# Patient Record
Sex: MALE | Race: BLACK OR AFRICAN AMERICAN | NOT HISPANIC OR LATINO | URBAN - METROPOLITAN AREA
[De-identification: names, ages, dates, MRNs, and addresses within clinical notes are randomized per-mention and may not be internally consistent; named-entity substitution may affect disease eponyms.]

---

## 2019-10-05 ENCOUNTER — EMERGENCY (EMERGENCY)
Facility: HOSPITAL | Age: 24
LOS: 0 days | Discharge: HOME | End: 2019-10-06
Attending: STUDENT IN AN ORGANIZED HEALTH CARE EDUCATION/TRAINING PROGRAM
Payer: OTHER MISCELLANEOUS

## 2019-10-05 VITALS
HEART RATE: 92 BPM | SYSTOLIC BLOOD PRESSURE: 149 MMHG | DIASTOLIC BLOOD PRESSURE: 92 MMHG | HEIGHT: 70 IN | TEMPERATURE: 98 F | OXYGEN SATURATION: 100 % | WEIGHT: 278 LBS | RESPIRATION RATE: 16 BRPM

## 2019-10-05 DIAGNOSIS — M25.562 PAIN IN LEFT KNEE: ICD-10-CM

## 2019-10-05 PROCEDURE — 99053 MED SERV 10PM-8AM 24 HR FAC: CPT

## 2019-10-05 PROCEDURE — 73564 X-RAY EXAM KNEE 4 OR MORE: CPT | Mod: 26,LT

## 2019-10-05 PROCEDURE — 99283 EMERGENCY DEPT VISIT LOW MDM: CPT

## 2019-10-05 RX ORDER — IBUPROFEN 200 MG
600 TABLET ORAL ONCE
Refills: 0 | Status: COMPLETED | OUTPATIENT
Start: 2019-10-05 | End: 2019-10-05

## 2019-10-05 NOTE — ED ADULT NURSE NOTE - OBJECTIVE STATEMENT
Patient came into the ER via EMS . 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 Patient is alert and orientedx3. Noted no complaints of pa in or any discomfort.

## 2019-10-06 VITALS
RESPIRATION RATE: 16 BRPM | SYSTOLIC BLOOD PRESSURE: 137 MMHG | DIASTOLIC BLOOD PRESSURE: 88 MMHG | HEART RATE: 87 BPM | OXYGEN SATURATION: 100 % | TEMPERATURE: 98 F

## 2019-10-06 RX ADMIN — Medication 600 MILLIGRAM(S): at 01:03

## 2019-10-06 RX ADMIN — Medication 600 MILLIGRAM(S): at 01:02

## 2019-10-06 NOTE — ED PROVIDER NOTE - PATIENT PORTAL LINK FT
You can access the FollowMyHealth Patient Portal offered by [facility] by registering at the following website: [email or web address]. By joining [facility]’s FollowMyHealth portal, you will also be able to view your health information using other applications (apps) compatible with our system.

## 2019-10-06 NOTE — ED PROVIDER NOTE - OBJECTIVE STATEMENT
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 Patient reports wheelchair carrying patient tipped and fell onto his knee, full weight of his patient fell onto his knee. States he felt his patellar bone dislocate and then pop back into place. Reports this has happened to him before. Reports he now has swelling and pain of knee with ROM. Denies numbness or tingling sensation. Denies skin breaks. Denies any other injuries. Reports he cannot bear weight fully, walking with a limp.

## 2019-10-06 NOTE — ED PROVIDER NOTE - CLINICAL SUMMARY MEDICAL DECISION MAKING FREE TEXT BOX
Male presents to ED for evaluation of left knee pain s/p injury ealier today, bearing weight with a limp - imaging, medicate, reassess

## 2019-10-06 NOTE — ED PROVIDER NOTE - PROGRESS NOTE DETAILS
Patient was seen and examined at bedside. Reports feeling much better after medications. Wants to be discharged home. Patient appears to be improved. Crutches given, crutch training given, and knee splint applied. Patient was seen and examined at bedside. Reports feeling much better after medications. Wants to be discharged home. Patient appears to be improved.

## 2019-10-06 NOTE — ED PROVIDER NOTE - CARE PROVIDER_API CALL
Agustín Mendiola)  Orthopaedic Surgery  [address] [place] [address]  Phone: [phone]  Fax: [phone]  Follow Up Time:

## 2019-10-06 NOTE — ED PROVIDER NOTE - NSFOLLOWUPINSTRUCTIONS_ED_ALL_ED_FT
Please return to Emergency Department immediately for any new, concerning, or worsening symptoms.   Please follow-up with orthopedics as recommended.    Please take prescriptions as discussed.

## 2019-10-06 NOTE — ED PROVIDER NOTE - NS ED ROS FT
Constitutional: no fevers or chills  Cardiac: no palpitations, chest pain  Lungs: no shortness of breath, wheezes  Abd: no abd pain, nausea, vomiting, diarrhea  Genitourinary: no dysuria, increased urinary frequency, hematuria  Neurology: no sensorimotor deficits, no dizziness, no headache, no visual changes  Skin: no rashes  All other ROS negative except as per HPI

## 2019-10-06 NOTE — ED PROVIDER NOTE - PHYSICAL EXAMINATION
Gen: no acute distress, well appearing, awake, alert and oriented x 3  Cardiac: regular rate and rhythm, +S1S2  Pulm: Clear to auscultation bilaterally  Abd: soft, nontender, nondistended, no guarding  Back: neg CVA ttp, nontender spine  Extremity: L LE: + ttp with mild edema, no ecchymosis, no skin breaks, FROM hip/knee/ankle joint, 2+ DP pulses, sensorimotor intact, neg Marshall's sign   Neuro: awake, alert, oriented x 3, sensorimotor intact

## 2020-04-26 ENCOUNTER — MESSAGE (OUTPATIENT)
Age: 25
End: 2020-04-26

## 2020-06-11 NOTE — ED ADULT NURSE NOTE - CAS EDP DISCH TYPE
Patient states that she has multiple jigger bites all over her arms and ankles. Patient has new bites popping up daily. She was bitten on Sunday while in [place]. Patient would like to know if there is a cream or medication for her to use. Home

## 2021-02-04 ENCOUNTER — TRANSCRIPTION ENCOUNTER (OUTPATIENT)
Age: 26
End: 2021-02-04

## 2021-08-17 ENCOUNTER — TRANSCRIPTION ENCOUNTER (OUTPATIENT)
Age: 26
End: 2021-08-17

## 2022-08-16 NOTE — ED ADULT NURSE NOTE - PAIN RATING/NUMBER SCALE (0-10): ACTIVITY
pristiq  
0

## 2022-09-02 PROBLEM — Z00.00 ENCOUNTER FOR PREVENTIVE HEALTH EXAMINATION: Status: ACTIVE | Noted: 2022-09-02

## 2022-09-06 ENCOUNTER — APPOINTMENT (OUTPATIENT)
Dept: PULMONOLOGY | Facility: CLINIC | Age: 27
End: 2022-09-06

## 2022-09-06 VITALS — DIASTOLIC BLOOD PRESSURE: 90 MMHG | SYSTOLIC BLOOD PRESSURE: 136 MMHG

## 2022-09-06 VITALS
SYSTOLIC BLOOD PRESSURE: 151 MMHG | TEMPERATURE: 98 F | WEIGHT: 298 LBS | RESPIRATION RATE: 16 BRPM | HEART RATE: 97 BPM | OXYGEN SATURATION: 97 % | HEIGHT: 70 IN | DIASTOLIC BLOOD PRESSURE: 101 MMHG | BODY MASS INDEX: 42.66 KG/M2

## 2022-09-06 PROCEDURE — 36415 COLL VENOUS BLD VENIPUNCTURE: CPT

## 2022-09-06 PROCEDURE — 99203 OFFICE O/P NEW LOW 30 MIN: CPT | Mod: 25

## 2022-09-06 NOTE — HISTORY OF PRESENT ILLNESS
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 Did PPD (done Aug 1st) for school which was reportedly positive (done at [employer]). Reports negative CXR.\par \par Born and raised in [place] Traveled to [place] Aug 2022 and [facility] Dec 2021. No known exposure to TB. 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 Never been/worked at [facility]/homeless shelter. \par Reports negative PPD a year ago. \par Denies cough, fevers, night sweats, wt loss, hemoptysis.\par \par PMH: DM, covid Sept 2021\par Meds: Metformin, Glimepiride\par All: NKDA\par SH: Never smoker\par FH: hx of multiple myeloma and HTN in mother\par PMD: THELMA VASQUES\par Immunizations: COVID vaccinated and boosted (march 2022)\par

## 2022-09-06 NOTE — CONSULT LETTER
[Dear  ___] : Dear  [unfilled], [Consult Letter:] : I had the pleasure of evaluating your patient, [unfilled]. [Please see my note below.] : Please see my note below. [Consult Closing:] : Thank you very much for allowing me to participate in the care of this patient.  If you have any questions, please do not hesitate to contact me. [FreeTextEntry3] : Sincerely,\par \par Swetha Mcnulty MD\par [facility] Physician [facility]\par Pulmonary Medicine\par tel: [phone]\par fax: [phone]\par

## 2022-09-06 NOTE — ASSESSMENT
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 \par Reports negative PPD a year ago and recent clear CXR. No s/s of active TB. 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 \par Treatment options and reasons for treatment discussed with patient. \par Will obtain Quant Gold, LFTs, CBC, HIV testing.\par Obtain CXR results (CD and report)\par \par All questions answered. Patient in agreement with plan. \par Follow up via TEB after blood work to discuss treatment.\par \par \par

## 2022-09-09 ENCOUNTER — APPOINTMENT (OUTPATIENT)
Dept: DERMATOLOGY | Facility: CLINIC | Age: 27
End: 2022-09-09

## 2022-09-09 ENCOUNTER — NON-APPOINTMENT (OUTPATIENT)
Age: 27
End: 2022-09-09

## 2022-09-09 VITALS — BODY MASS INDEX: 42.52 KG/M2 | HEIGHT: 70 IN | WEIGHT: 297 LBS

## 2022-09-09 PROCEDURE — 17110 DESTRUCTION B9 LES UP TO 14: CPT

## 2022-09-09 PROCEDURE — 99204 OFFICE O/P NEW MOD 45 MIN: CPT | Mod: 25

## 2022-09-09 RX ORDER — HYDROCORTISONE 25 MG/G
2.5 CREAM TOPICAL
Qty: 1 | Refills: 3 | Status: ACTIVE | COMMUNITY
Start: 2022-09-09 | End: 1900-01-01

## 2022-09-12 ENCOUNTER — NON-APPOINTMENT (OUTPATIENT)
Age: 27
End: 2022-09-12

## 2022-09-12 LAB
ALBUMIN SERPL ELPH-MCNC: 4.5 G/DL
ALP BLD-CCNC: 48 U/L
ALT SERPL-CCNC: 33 U/L
ANION GAP SERPL CALC-SCNC: 15 MMOL/L
AST SERPL-CCNC: 19 U/L
BASOPHILS # BLD AUTO: 0.02 K/UL
BASOPHILS NFR BLD AUTO: 0.3 %
BILIRUB SERPL-MCNC: 0.4 MG/DL
BUN SERPL-MCNC: 12 MG/DL
CALCIUM SERPL-MCNC: 10 MG/DL
CHLORIDE SERPL-SCNC: 100 MMOL/L
CO2 SERPL-SCNC: 21 MMOL/L
CREAT SERPL-MCNC: 0.79 MG/DL
EGFR: 123 ML/MIN/1.73M2
EOSINOPHIL # BLD AUTO: 0.38 K/UL
EOSINOPHIL NFR BLD AUTO: 6 %
GLUCOSE SERPL-MCNC: 250 MG/DL
HCT VFR BLD CALC: 45.8 %
HGB BLD-MCNC: 14.4 G/DL
HIV1+2 AB SPEC QL IA.RAPID: NONREACTIVE
IMM GRANULOCYTES NFR BLD AUTO: 0.5 %
LYMPHOCYTES # BLD AUTO: 2.49 K/UL
LYMPHOCYTES NFR BLD AUTO: 39 %
M TB IFN-G BLD-IMP: POSITIVE
MAN DIFF?: NORMAL
MCHC RBC-ENTMCNC: 23.8 PG
MCHC RBC-ENTMCNC: 31.4 GM/DL
MCV RBC AUTO: 75.8 FL
MONOCYTES # BLD AUTO: 0.42 K/UL
MONOCYTES NFR BLD AUTO: 6.6 %
NEUTROPHILS # BLD AUTO: 3.04 K/UL
NEUTROPHILS NFR BLD AUTO: 47.6 %
PLATELET # BLD AUTO: 284 K/UL
POTASSIUM SERPL-SCNC: 4.1 MMOL/L
PROT SERPL-MCNC: 7.3 G/DL
QUANTIFERON TB PLUS MITOGEN MINUS NIL: 2.42 IU/ML
QUANTIFERON TB PLUS NIL: 0.03 IU/ML
QUANTIFERON TB PLUS TB1 MINUS NIL: 6.74 IU/ML
QUANTIFERON TB PLUS TB2 MINUS NIL: 6.04 IU/ML
RBC # BLD: 6.04 M/UL
RBC # FLD: 15.6 %
SODIUM SERPL-SCNC: 136 MMOL/L
WBC # FLD AUTO: 6.38 K/UL

## 2022-09-16 ENCOUNTER — APPOINTMENT (OUTPATIENT)
Dept: PULMONOLOGY | Facility: CLINIC | Age: 27
End: 2022-09-16

## 2022-09-16 DIAGNOSIS — R76.11 NONSPECIFIC REACTION TO TUBERCULIN SKIN TEST W/OUT ACTIVE TUBERCULOSIS: ICD-10-CM

## 2022-09-16 PROCEDURE — 99214 OFFICE O/P EST MOD 30 MIN: CPT | Mod: 95

## 2022-09-16 NOTE — HISTORY OF PRESENT ILLNESS
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 Reports negative CXR.\par \par Born [place] raised in [place] Traveled to [place] Aug 2022 and [facility] Dec 2021. No known exposure to TB. 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 \par Reports negative PPD a year ago. \par Denies cough, fevers, night sweats, wt loss, hemoptysis.\par \par PMH: DM, covid Sept 2021\par Meds: Metformin, Glimepiride\par All: NKDA\par SH: Never smoker\par FH: hx of multiple myeloma and HTN in mother\par PMD: THELMA VASQUES\par Immunizations: COVID vaccinated and boosted (march 2022)\par

## 2022-09-16 NOTE — CONSULT LETTER
[Dear  ___] : Dear  [unfilled], [Consult Letter:] : I had the pleasure of evaluating your patient, [unfilled]. [Please see my note below.] : Please see my note below. [Consult Closing:] : Thank you very much for allowing me to participate in the care of this patient.  If you have any questions, please do not hesitate to contact me. [FreeTextEntry3] : Sincerely,\par \par Swetha Mcnulty MD\par [facility] Physician [facility]\par Pulmonary Medicine\par tel: [phone]\par fax: [phone]\par

## 2022-09-16 NOTE — ASSESSMENT
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 Patient denies any s/s of TB. \par CXR report reviewed - GAMAL. Overall c/w LTBI. Overall risk of reactivation TB is low. \par Therapy options discussed with patient. Patient declines therapy at this time. \par Patient understands risk of reactivation TB, understands risk would increase if he ever required immunosuppression. Common s/s of TB discussed with patient. \par \par He can follow up as needed or with any new or concerning sx. \par \par \par

## 2022-09-16 NOTE — REASON FOR VISIT
[Home] : at home, [unfilled] , at the time of the visit. [Medical Office: ([facility])___] : at the medical office located in  [Patient] : the patient

## 2022-10-13 ENCOUNTER — APPOINTMENT (OUTPATIENT)
Dept: DERMATOLOGY | Facility: CLINIC | Age: 27
End: 2022-10-13

## 2022-10-13 DIAGNOSIS — B07.9 VIRAL WART, UNSPECIFIED: ICD-10-CM

## 2022-10-13 DIAGNOSIS — L73.1 PSEUDOFOLLICULITIS BARBAE: ICD-10-CM

## 2022-10-13 PROCEDURE — 99213 OFFICE O/P EST LOW 20 MIN: CPT | Mod: GC

## 2022-12-08 ENCOUNTER — APPOINTMENT (OUTPATIENT)
Dept: DERMATOLOGY | Facility: CLINIC | Age: 27
End: 2022-12-08

## 2023-12-24 ENCOUNTER — EMERGENCY (EMERGENCY)
Facility: HOSPITAL | Age: 28
LOS: 1 days | Discharge: HOME | End: 2023-12-24
Attending: EMERGENCY MEDICINE
Payer: MEDICAID

## 2023-12-24 VITALS
OXYGEN SATURATION: 98 % | HEIGHT: 70 IN | HEART RATE: 105 BPM | RESPIRATION RATE: 18 BRPM | DIASTOLIC BLOOD PRESSURE: 78 MMHG | SYSTOLIC BLOOD PRESSURE: 136 MMHG | WEIGHT: 298.95 LBS | TEMPERATURE: 99 F

## 2023-12-24 VITALS
HEART RATE: 92 BPM | TEMPERATURE: 99 F | DIASTOLIC BLOOD PRESSURE: 78 MMHG | RESPIRATION RATE: 18 BRPM | SYSTOLIC BLOOD PRESSURE: 127 MMHG | OXYGEN SATURATION: 95 %

## 2023-12-24 LAB
FLUAV AG NPH QL: SIGNIFICANT CHANGE UP
FLUAV AG NPH QL: SIGNIFICANT CHANGE UP
FLUBV AG NPH QL: SIGNIFICANT CHANGE UP
FLUBV AG NPH QL: SIGNIFICANT CHANGE UP
RSV RNA NPH QL NAA+NON-PROBE: SIGNIFICANT CHANGE UP
RSV RNA NPH QL NAA+NON-PROBE: SIGNIFICANT CHANGE UP
SARS-COV-2 RNA SPEC QL NAA+PROBE: SIGNIFICANT CHANGE UP
SARS-COV-2 RNA SPEC QL NAA+PROBE: SIGNIFICANT CHANGE UP

## 2023-12-24 PROCEDURE — 99284 EMERGENCY DEPT VISIT MOD MDM: CPT | Mod: 25

## 2023-12-24 PROCEDURE — 71046 X-RAY EXAM CHEST 2 VIEWS: CPT | Mod: 26

## 2023-12-24 PROCEDURE — 71046 X-RAY EXAM CHEST 2 VIEWS: CPT

## 2023-12-24 PROCEDURE — 99284 EMERGENCY DEPT VISIT MOD MDM: CPT

## 2023-12-24 PROCEDURE — 87637 SARSCOV2&INF A&B&RSV AMP PRB: CPT

## 2023-12-24 RX ADMIN — Medication 100 MILLIGRAM(S): at 18:13

## 2023-12-24 NOTE — ED PROVIDER NOTE - PHYSICAL EXAMINATION
Constitutional: VS reviewed. Alert and orientedx3, well appearing, no apparent distress. + dry cough  HEENT: Atraumatic, EOMI  CV: RRR  Lungs: Clear and equal bilaterally, no wheezes, rales or crackles  Abdomen: Soft, nondistended, nontender  MSK: No deformities  Skin: Warm and dry. As visualized no rashes, lesions, bruising or erythema  Neuro: Strength and sensation intact.   Lymph: No pitting edema in extremities.

## 2023-12-24 NOTE — ED ADULT NURSE NOTE - NSFALLUNIVINTERV_ED_ALL_ED
Bed/Stretcher in lowest position, wheels locked, appropriate side rails in place/Call bell, personal items and telephone in reach/Instruct patient to call for assistance before getting out of bed/chair/stretcher/Non-slip footwear applied when patient is off stretcher/[place] to call system/Physically safe environment - no spills, clutter or unnecessary equipment/Purposeful proactive rounding/Room/bathroom lighting operational, light cord in reach Bed/Stretcher in lowest position, wheels locked, appropriate side rails in place/Call bell, personal items and telephone in reach/Instruct patient to call for assistance before getting out of bed/chair/stretcher/Non-slip footwear applied when patient is off stretcher/[place] to call system/Physically safe environment - no spills, clutter or unnecessary equipment/Purposeful proactive rounding/Room/bathroom lighting operational, light cord in reach

## 2023-12-24 NOTE — ED PROVIDER NOTE - NSFOLLOWUPINSTRUCTIONS_ED_ALL_ED_FT
You were evaluated in the Emergency Department today for a cough. Your chest xray did not show evidence of a pneumonia- your cough is most likely due to a viral illness which will improve on its own with rest and fluids. You can take over the counter medications such as dextromethorphan to help manage your symptoms.    Please schedule an appointment for follow up with your primary care physician within two days.    Return to the Emergency Department if you experience worsening cough, fever 100.4 ° F or greater, recurrent vomiting, chest pain, shortness of breath, or any other concerning symptoms.    Thank you for choosing us for your care

## 2023-12-24 NOTE — ED PROVIDER NOTE - OBJECTIVE STATEMENT
27 y/o Male with past medical history of HTN, PE–DM presents emergency department for 6 days of persistent cough.  Patient states he has had dry cough that has made sleeping difficult.  Patient states his mom was recently diagnosed with pneumonia and he is concerned that he may have it as well.  Patient denies fevers, chills, headache, vision changes, chest pain, trouble breathing, abdominal pain, nausea/vomiting, diarrhea/constipation, dysuria, hematuria.

## 2023-12-24 NOTE — ED PROVIDER NOTE - ATTENDING CONTRIBUTION TO CARE
Patient is a 28-year-old male prediabetic no smoking history presenting with flulike symptoms since Monday weakness since the cough no shortness of breath has been taking multiple different modalities of cough medicine without improvement some weakness vital signs are stable lungs clear auscultation satting 99% on room air COVID flu swab chest x-ray ordered.

## 2023-12-24 NOTE — ED ADULT NURSE NOTE - DISTAL EXTREMITY CAPILLARY REFILL
History and Physical


Time Seen By MD:  01:22


Hx. of Stated Complaint:  


2 WEEKS AGO HEARD A CRUNCH IN LEFT KNEE. WORSENING PAIN SINCE


HPI/ROS


CHIEF COMPLAINT: Knee pain, swelling





HISTORY OF PRESENT ILLNESS: 59-year-old male presents with bilateral knee pain, 


bilateral leg swelling. He states that he has had chronic knee problems and 


reports a history of degenerative joint disease. He states that he is trying to 


get an appointment for a knee replacement for the right greater than left knee. 


Patient states that 2 weeks ago he twisted and heard a crunch in his left knee, 


but has had increased pain in both knees since then. He states that he feels 


swelling behind the right knee and notes increased swelling. He has had no other


injuries. He has had no recent travel. He has no history of DVT or PE. He has 


had no chest pain or trouble breathing. He has had no change in urination. He 


takes ibuprofen and Tylenol daily. He does not take other medication for pain. 


When asked why his chief complaint was "legs not working", he states that 


because of the pain he is having increased difficulty ambulation. He also states


that he is having some numbness in his feet that he has not previously had. This


has been gradual and intermittent.





REVIEW OF SYSTEMS:


Constitutional: No fever, no chills.


Eyes: No discharge.


ENT: No sore throat. 


Cardiovascular: No chest pain, no palpitations.


Respiratory: No cough, no shortness of breath.


Gastrointestinal: No abdominal pain, no vomiting.


Genitourinary: no change in urination


Musculoskeletal: No back pain.


Skin: No rashes.


Neurological: No headache.


Remainder of the 14 system rev:  Yes


Allergies:  


Coded Allergies:  


     morphine (Verified  Allergy, Mild, 9/23/18)


Home Meds


Active Scripts


Hydrocodone Bit/Acetaminophen (NORCO 5-325 TABLET) 1 Each Tablet, 1 EACH PO Q6H 


for PAIN, #10 TAB


   Prov:MARY COREA MD         4/4/19


Reported Medications


Losartan Potassium (LOSARTAN POTASSIUM) 25 Mg Tablet, PO QDAY


   4/4/19


Discontinued Reported Medications


Acetaminophen (TYLENOL) 325 Mg Tablet, 325 MG PO, TAB


   9/23/18


Discontinued Scripts


Diltiazem Hcl (DILTIAZEM ER) 240 Mg Capsule.er, 240 MG PO QDAY, #60 CAP.SR.24H 0


Refills


   Prov:SHARLENE ACE MD         9/23/18


Valacyclovir Hcl (VALACYCLOVIR) 1,000 Mg Tablet, 1000 MG PO TID for 7 Days, #21 


TAB 0 Refills


   Prov:SHARLENE ACE MD         9/23/18


Prednisone (PREDNISONE) 20 Mg Tablet, 20 MG PO AS DIRECTED, #23 TAB 0 Refills


   Take 3 tablets once a day for 5 days,


   then take 2 1/2 tablets once a day for 1 day,


   then take 2 tablets once a day for 1 day,


   then take 1 1/2 tablets once a day for 1 day,


   then take 1 tablet once a day for 1 day,


   then take 1/2 tablet once a day for 1 day,


   then stop


   Prov:SHARLENE ACE MD         9/23/18


Ibuprofen (IBUPROFEN) 800 Mg Tablet, 1 TAB PO Q8H for PAIN, #60 TAB


   Prov:LORENZA COREAS DO         8/10/16


Reviewed Nurses Notes:  Yes


Hx Smoking:  No


Smoking Status:  Never Smoker


Exposure to Second Hand Smoke?:  No


Hx Substance Use Disorder:  No


Hx Alcohol Use:  No


Constitutional





Vital Sign - Last 24 Hours








 4/4/19 4/4/19 4/4/19 4/4/19





 00:53 01:00 01:30 02:00


 


Temp 98.1   


 


Pulse 88 76 70 62


 


Resp 22   


 


B/P (MAP) 215/107 181/106 (131) 177/84 (115) 169/84 (112)


 


Pulse Ox 90 83 88 86


 


O2 Delivery Room Air   


 


    





 4/4/19 4/4/19  





 02:30 03:00  


 


Pulse  65  


 


B/P (MAP) 167/80 (109)   


 


Pulse Ox 86   








Physical Exam


   General Appearance: The patient is alert, has no immediate need for airway 


protection and no signs of toxicity. 


Eyes: Pupils equal and round no pallor or injection.


ENT, Mouth: Mucous membranes are moist.


Respiratory: There are no retractions, lungs are clear to auscultation.


Cardiovascular: Regular rate and rhythm. 


Gastrointestinal:  Abdomen is soft and non tender, no masses, bowel sounds 


normal.


Neurological: alert, moves all ext


Skin: Warm and dry, eczematous exanthem left lateral calf. non tender


Musculoskeletal:  


Patient is obese with bilateral fullness, without lymphedema. He has tenderness 


to palpation right medial thigh without palpable cords or erythema. He has 


tenderness to palpation right popliteal with fullness consistent with Bakers 


cyst. He has tenderness to palpation right lateral and medial knee joint. He is 


able to actively range from 0-100 bilaterally. He has tenderness left popliteal


without significant fullness. He has no left-sided tenderness. He has normal 


pulses distally.





DIFFERENTIAL DIAGNOSIS: After history and physical exam differential diagnosis 


was considered for renal failure, chf, dvt, fracture, or other emergent 


etiology-





Medical Decision Making


Data Points


Result Diagram:  


4/4/19 0140                                                                     


          4/4/19 0140





Laboratory





Hematology








Test


 4/4/19


01:40


 


Red Blood Count


 4.70 M/uL


(4.00-5.60)


 


Mean Corpuscular Volume


 80.9 fL


(80.0-96.0)


 


Mean Corpuscular Hemoglobin


 26.0 pg


(26.0-33.0)


 


Mean Corpuscular Hemoglobin


Concent 32.1 g/dL


(32.0-36.0)


 


Red Cell Distribution Width


 14.9 %


(11.5-14.5)


 


Mean Platelet Volume


 8.1 fL


(7.2-11.1)


 


Neutrophils (%) (Auto)


 63.8 %


(39.4-72.5)


 


Lymphocytes (%) (Auto)


 25.1 %


(17.6-49.6)


 


Monocytes (%) (Auto)


 7.9 %


(4.1-12.4)


 


Eosinophils (%) (Auto)


 2.6 %


(0.4-6.7)


 


Basophils (%) (Auto)


 0.6 %


(0.3-1.4)


 


Nucleated RBC Relative Count


(auto) 0.0 /100WBC 





 


Neutrophils # (Auto)


 4.4 K/uL


(2.0-7.4)


 


Lymphocytes # (Auto)


 1.7 K/uL


(1.3-3.6)


 


Monocytes # (Auto)


 0.5 K/uL


(0.3-1.0)


 


Eosinophils # (Auto)


 0.2 K/uL


(0.0-0.5)


 


Basophils # (Auto)


 0.0 K/uL


(0.0-0.1)


 


Nucleated RBC Absolute Count


(auto) 0.00 K/uL 





 


D-Dimer Quantitative (PE/DVT)


 0.95 ug/ml


(0-0.50)


 


Sodium Level


 139 mmol/L


(137-145)


 


Potassium Level


 4.0 mmol/L


(3.5-5.0)


 


Chloride Level


 102 mmol/L


()


 


Carbon Dioxide Level


 30 mmol/L


(22-30)


 


Blood Urea Nitrogen


 10 mg/dl


(9-21)


 


Creatinine


 0.70 mg/dl


(0.66-1.25)


 


Glomerular Filtration Rate


Calc > 60.0 





 


Random Glucose


 97 mg/dl


()


 


Calcium Level


 8.7 mg/dl


(8.4-10.2)


 


Total Bilirubin


 0.4 mg/dl


(0.2-1.3)


 


Aspartate Amino Transf


(AST/SGOT) 21 U/L (0-35) 





 


Alanine Aminotransferase


(ALT/SGPT) 30 U/L (0-56) 





 


Alkaline Phosphatase 63 U/L (0-126) 


 


B-Type Natriuretic Peptide


 363 pg/ml


(0-100)


 


Total Protein


 7.0 g/dl


(6.3-8.2)


 


Albumin


 4.0 g/dl


(3.5-5.0)








Chemistry








Test


 4/4/19


01:40


 


White Blood Count


 6.9 k/uL


(4.5-11.0)


 


Red Blood Count


 4.70 M/uL


(4.00-5.60)


 


Hemoglobin


 12.2 g/dL


(14.0-18.0)


 


Hematocrit


 38.1 %


(42.0-52.0)


 


Mean Corpuscular Volume


 80.9 fL


(80.0-96.0)


 


Mean Corpuscular Hemoglobin


 26.0 pg


(26.0-33.0)


 


Mean Corpuscular Hemoglobin


Concent 32.1 g/dL


(32.0-36.0)


 


Red Cell Distribution Width


 14.9 %


(11.5-14.5)


 


Platelet Count


 276 K/uL


(150-450)


 


Mean Platelet Volume


 8.1 fL


(7.2-11.1)


 


Neutrophils (%) (Auto)


 63.8 %


(39.4-72.5)


 


Lymphocytes (%) (Auto)


 25.1 %


(17.6-49.6)


 


Monocytes (%) (Auto)


 7.9 %


(4.1-12.4)


 


Eosinophils (%) (Auto)


 2.6 %


(0.4-6.7)


 


Basophils (%) (Auto)


 0.6 %


(0.3-1.4)


 


Nucleated RBC Relative Count


(auto) 0.0 /100WBC 





 


Neutrophils # (Auto)


 4.4 K/uL


(2.0-7.4)


 


Lymphocytes # (Auto)


 1.7 K/uL


(1.3-3.6)


 


Monocytes # (Auto)


 0.5 K/uL


(0.3-1.0)


 


Eosinophils # (Auto)


 0.2 K/uL


(0.0-0.5)


 


Basophils # (Auto)


 0.0 K/uL


(0.0-0.1)


 


Nucleated RBC Absolute Count


(auto) 0.00 K/uL 





 


D-Dimer Quantitative (PE/DVT)


 0.95 ug/ml


(0-0.50)


 


Glomerular Filtration Rate


Calc > 60.0 





 


Calcium Level


 8.7 mg/dl


(8.4-10.2)


 


Total Bilirubin


 0.4 mg/dl


(0.2-1.3)


 


Aspartate Amino Transf


(AST/SGOT) 21 U/L (0-35) 





 


Alanine Aminotransferase


(ALT/SGPT) 30 U/L (0-56) 





 


Alkaline Phosphatase 63 U/L (0-126) 


 


B-Type Natriuretic Peptide


 363 pg/ml


(0-100)


 


Total Protein


 7.0 g/dl


(6.3-8.2)


 


Albumin


 4.0 g/dl


(3.5-5.0)








Coagulation








Test


 4/4/19


01:40


 


D-Dimer Quantitative (PE/DVT) 0.95 ug/ml 











ED Course/Re-evaluation


ED Course


59-year-old male presents with knee pain, leg swelling. This is been ongoing but


worse 2 weeks. This has been despite losing 100 pounds over the past year as he


is motivated to have knee replacement. I evaluated for renal failure, CHF, DVT, 


or other causes of edema. As his d-dimer was elevated, I ordered ultrasound 


which is negative. He does not have signs or symptoms of PE (he denies recent 


chest pain or dyspnea). His BNP is elevated though in the indiscriminatory zone.


He has been on diuretics in the past, and I recommend his primary doctor 


evaluate to determine if he needs to be on these as well as a follow-up echo. I 


will offer pain medication for 2-3 days and reassessment with primary doctor. I 


believe pain is due to worsening arthritis in combination with increased edema. 


Patient understands strict return precautions.


Decision to Disposition Date:  Apr 4, 2019


Decision to Disposition Time:  03:11





Depart


Departure


Latest Vital Signs





Vital Signs








  Date Time  Temp Pulse Resp B/P (MAP) Pulse Ox O2 Delivery O2 Flow Rate FiO2


 


4/4/19 03:00  65      


 


4/4/19 02:30    167/80 (109) 86   


 


4/4/19 00:53 98.1  22   Room Air  








Impression:  


   Primary Impression:  


   Knee pain


   Additional Impressions:  


   Leg swelling


   Elevated brain natriuretic peptide (BNP) level


Condition:  Improved


Disposition:  HOME OR SELF-CARE


Referrals:  


LISSY OSMAN FNP (PCP)


2 Days


New Scripts


Hydrocodone Bit/Acetaminophen (NORCO 5-325 TABLET) 1 Each Tablet


1 EACH PO Q6H for PAIN, #10 TAB


   Prov: MARY COREA MD         4/4/19


Patient Instructions:  Leg Edema (ED)





Additional Instructions:  


As we discussed, follow up with your primary doctor to see if she feels you need


to be back on a diuretic for your leg swelling and elevated BNP. She may also 


want to schedule an echocardiogram. You may take the medication as prescribed 


but should not be on this for more than 2-3 days. Please return for worsening 


symptoms or any concerns.





Problem Qualifiers








   Primary Impression:  


   Knee pain


   Chronicity:  chronic  Laterality:  bilateral  Qualified Codes:  M25.561 - 


   Pain in right knee; M25.562 - Pain in left knee; G89.29 - Other chronic pain








MARY COREA MD                 Apr 4, 2019 01:39
2 seconds or less

## 2023-12-24 NOTE — ED PROVIDER NOTE - PATIENT PORTAL LINK FT
You can access the FollowMyHealth Patient Portal offered by [facility] by registering at the following website: [email or web address]. By joining [employer]’s FollowMyHealth portal, you will also be able to view your health information using other applications (apps) compatible with our system. You can access the FollowMyHealth Patient Portal offered by [facility] by registering at the following website: [email or web address]. By joining [employer]’s FollowMyHealth portal, you will also be able to view your health information using other applications (apps) compatible with our system.

## 2023-12-24 NOTE — ED PROVIDER NOTE - PROGRESS NOTE DETAILS
Halley Lopez PGY2: CXR shows no focal consolidations. pt okay for dc at this time. Return precautions discussed. Questions answered.

## 2023-12-24 NOTE — ED ADULT NURSE NOTE - OBJECTIVE STATEMENT
27yo M aaox4 h/o HTN, , presents ambulatory to ED from home, pe rpt reports 7 days ago: productive cough and cp associated with cough, pt is concern that his mother has PNA and drink from her cup, on examination Pt denies, SOB, HA, vision changes, n/v/d, fevers chills, abdominal pain, weakness, Safety and comfort measures initiated- bed placed in lowest position and side rails raised. Pt oriented to call bell system.

## 2024-03-22 NOTE — ED PROVIDER NOTE - CARE PROVIDERS DIRECT ADDRESSES
Mr Wilkinson is a 74 yo M with PMH HTN that presents to the ED after a positive stress test. He has been having chest discomfort at rest, but not associated with activity (exercise/golf). He saw his PCP 3/11 and EKG showed NSR with RBBB and PVCs. The reports chest pain that's started months ago, lats for a few minutes, associated with actvitiy and relieves with rest. 2 nights ago while moving heavy items , he developed angina that resovled few minutes after resting. He alerted his PCP who ordered an exercise stress today. During the procedure he developed angina. Patient contacted by Dr Howard and advised to go to the ED for coronary angiogram. He is currently chest pain free. Denies any dyspnea, LH, LE edema. He only takes valsartan for his BP. He denies palpitations.      In the ED, his BP was elevated SBP 180s that improved to 150s. ECG shows RBBB, ST depressions in inferolateral leads and new onset atrial fib with rates 90s-120bpms. IC consulted for [facility]   ,DirectAddress_Unknown

## 2024-03-26 ENCOUNTER — EMERGENCY (EMERGENCY)
Facility: HOSPITAL | Age: 29
LOS: 1 days | Discharge: HOME | End: 2024-03-26
Attending: STUDENT IN AN ORGANIZED HEALTH CARE EDUCATION/TRAINING PROGRAM
Payer: MEDICAID

## 2024-03-26 VITALS
DIASTOLIC BLOOD PRESSURE: 86 MMHG | HEART RATE: 80 BPM | TEMPERATURE: 98 F | SYSTOLIC BLOOD PRESSURE: 139 MMHG | RESPIRATION RATE: 16 BRPM | OXYGEN SATURATION: 97 %

## 2024-03-26 VITALS
RESPIRATION RATE: 22 BRPM | TEMPERATURE: 98 F | SYSTOLIC BLOOD PRESSURE: 156 MMHG | DIASTOLIC BLOOD PRESSURE: 11 MMHG | OXYGEN SATURATION: 100 % | HEIGHT: 70 IN | WEIGHT: 294.98 LBS | HEART RATE: 92 BPM

## 2024-03-26 LAB
ALBUMIN SERPL ELPH-MCNC: 4.6 G/DL — SIGNIFICANT CHANGE UP (ref 3.3–5)
ALP SERPL-CCNC: 46 U/L — SIGNIFICANT CHANGE UP (ref 40–120)
ALT FLD-CCNC: 57 U/L — HIGH (ref 10–45)
ANION GAP SERPL CALC-SCNC: 19 MMOL/L — HIGH (ref 5–17)
AST SERPL-CCNC: 30 U/L — SIGNIFICANT CHANGE UP (ref 10–40)
BASOPHILS # BLD AUTO: 0.07 K/UL — SIGNIFICANT CHANGE UP (ref 0–0.2)
BASOPHILS NFR BLD AUTO: 0.9 % — SIGNIFICANT CHANGE UP (ref 0–2)
BILIRUB SERPL-MCNC: 0.6 MG/DL — SIGNIFICANT CHANGE UP (ref 0.2–1.2)
BUN SERPL-MCNC: 11 MG/DL — SIGNIFICANT CHANGE UP (ref 7–23)
CALCIUM SERPL-MCNC: 9.7 MG/DL — SIGNIFICANT CHANGE UP (ref 8.4–10.5)
CHLORIDE SERPL-SCNC: 99 MMOL/L — SIGNIFICANT CHANGE UP (ref 96–108)
CK MB BLD-MCNC: 1 % — SIGNIFICANT CHANGE UP (ref 0–3.5)
CK MB CFR SERPL CALC: 3.6 NG/ML — SIGNIFICANT CHANGE UP (ref 0–6.7)
CK SERPL-CCNC: 359 U/L — HIGH (ref 30–200)
CO2 SERPL-SCNC: 19 MMOL/L — LOW (ref 22–31)
CREAT SERPL-MCNC: 0.75 MG/DL — SIGNIFICANT CHANGE UP (ref 0.5–1.3)
EGFR: 124 ML/MIN/1.73M2 — SIGNIFICANT CHANGE UP
EOSINOPHIL # BLD AUTO: 0.3 K/UL — SIGNIFICANT CHANGE UP (ref 0–0.5)
EOSINOPHIL NFR BLD AUTO: 3.6 % — SIGNIFICANT CHANGE UP (ref 0–6)
GLUCOSE SERPL-MCNC: 151 MG/DL — HIGH (ref 70–99)
HCT VFR BLD CALC: 46.8 % — SIGNIFICANT CHANGE UP (ref 39–50)
HGB BLD-MCNC: 14.8 G/DL — SIGNIFICANT CHANGE UP (ref 13–17)
LYMPHOCYTES # BLD AUTO: 3.82 K/UL — HIGH (ref 1–3.3)
LYMPHOCYTES # BLD AUTO: 46 % — HIGH (ref 13–44)
MAGNESIUM SERPL-MCNC: 1.7 MG/DL — SIGNIFICANT CHANGE UP (ref 1.6–2.6)
MCHC RBC-ENTMCNC: 23.3 PG — LOW (ref 27–34)
MCHC RBC-ENTMCNC: 31.6 GM/DL — LOW (ref 32–36)
MCV RBC AUTO: 73.6 FL — LOW (ref 80–100)
MONOCYTES # BLD AUTO: 0.75 K/UL — SIGNIFICANT CHANGE UP (ref 0–0.9)
MONOCYTES NFR BLD AUTO: 9 % — SIGNIFICANT CHANGE UP (ref 2–14)
NEUTROPHILS # BLD AUTO: 3.36 K/UL — SIGNIFICANT CHANGE UP (ref 1.8–7.4)
NEUTROPHILS NFR BLD AUTO: 40.5 % — LOW (ref 43–77)
NT-PROBNP SERPL-SCNC: <36 PG/ML — SIGNIFICANT CHANGE UP (ref 0–300)
PLATELET # BLD AUTO: 309 K/UL — SIGNIFICANT CHANGE UP (ref 150–400)
POTASSIUM SERPL-MCNC: 4.3 MMOL/L — SIGNIFICANT CHANGE UP (ref 3.5–5.3)
POTASSIUM SERPL-SCNC: 4.3 MMOL/L — SIGNIFICANT CHANGE UP (ref 3.5–5.3)
PROT SERPL-MCNC: 8 G/DL — SIGNIFICANT CHANGE UP (ref 6–8.3)
RBC # BLD: 6.36 M/UL — HIGH (ref 4.2–5.8)
RBC # FLD: 14.5 % — SIGNIFICANT CHANGE UP (ref 10.3–14.5)
SODIUM SERPL-SCNC: 137 MMOL/L — SIGNIFICANT CHANGE UP (ref 135–145)
TROPONIN T, HIGH SENSITIVITY RESULT: 12 NG/L — SIGNIFICANT CHANGE UP (ref 0–51)
TROPONIN T, HIGH SENSITIVITY RESULT: 15 NG/L — SIGNIFICANT CHANGE UP (ref 0–51)
TSH SERPL-MCNC: 4.94 UIU/ML — HIGH (ref 0.27–4.2)
WBC # BLD: 8.3 K/UL — SIGNIFICANT CHANGE UP (ref 3.8–10.5)
WBC # FLD AUTO: 8.3 K/UL — SIGNIFICANT CHANGE UP (ref 3.8–10.5)

## 2024-03-26 PROCEDURE — 82550 ASSAY OF CK (CPK): CPT

## 2024-03-26 PROCEDURE — 80053 COMPREHEN METABOLIC PANEL: CPT

## 2024-03-26 PROCEDURE — 99285 EMERGENCY DEPT VISIT HI MDM: CPT | Mod: 25

## 2024-03-26 PROCEDURE — 93005 ELECTROCARDIOGRAM TRACING: CPT

## 2024-03-26 PROCEDURE — 71046 X-RAY EXAM CHEST 2 VIEWS: CPT | Mod: 26

## 2024-03-26 PROCEDURE — 85025 COMPLETE CBC W/AUTO DIFF WBC: CPT

## 2024-03-26 PROCEDURE — 83735 ASSAY OF MAGNESIUM: CPT

## 2024-03-26 PROCEDURE — 71046 X-RAY EXAM CHEST 2 VIEWS: CPT

## 2024-03-26 PROCEDURE — 36415 COLL VENOUS BLD VENIPUNCTURE: CPT

## 2024-03-26 PROCEDURE — 84443 ASSAY THYROID STIM HORMONE: CPT

## 2024-03-26 PROCEDURE — 83880 ASSAY OF NATRIURETIC PEPTIDE: CPT

## 2024-03-26 PROCEDURE — 84484 ASSAY OF TROPONIN QUANT: CPT

## 2024-03-26 PROCEDURE — 82553 CREATINE MB FRACTION: CPT

## 2024-03-26 RX ORDER — SODIUM CHLORIDE 9 MG/ML
1000 INJECTION INTRAMUSCULAR; INTRAVENOUS; SUBCUTANEOUS ONCE
Refills: 0 | Status: DISCONTINUED | OUTPATIENT
Start: 2024-03-26 | End: 2024-03-29

## 2024-03-26 NOTE — ED ADULT NURSE NOTE - OBJECTIVE STATEMENT
Pt is a 28 y.o male c.o palpitations. PT is A&Ox3 resting in stretcher. PT endorses palpitations x2 days. Pt states he has intermittent sensations of his "heart racing" which worsens when he first wakes up from sleep. Pt states he woke up around 2 AM today with the sensation which prompted his visit to the ED. Of note, pt has a family hx of sleep apnea but he has never had a formal sleep study. 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 Spontaneously breathing on RA>95%, CM NSR, peripheral pulses present, skin dry and intact. Pt denies any CP, SOB, fever, N/V/D, HA, dizziness, lightheadedness. Safety and comfort measures in place bed in lowest position, siderails upright and blanket given.

## 2024-03-26 NOTE — ED ADULT NURSE NOTE - NSFALLUNIVINTERV_ED_ALL_ED
"SNF - Physical Therapy Treatment Note   Francesca Cardozo     Patient Name: Lloyd Greene  : 1964  MRN: [id number]  Today's Date: 2020  Onset of Illness/Injury or Date of Surgery: 20  Date of Referral to PT: 20  Referring Physician: Dr. Jerome    Admit Date: 2020    Visit Dx:  No diagnosis found.  Patient Active Problem List   Diagnosis   • Other chronic pain   • Partial small bowel obstruction (CMS/HCC)   • Angio-edema   • Adenomatous polyp of colon   • Type 2 diabetes mellitus without complication, without long-term current use of insulin (CMS/HCC)   • Familial hypercholesterolemia   • Hypertension, essential   • Type 2 diabetes mellitus with diabetic polyneuropathy, without long-term current use of insulin (CMS/HCC)   • DDD (degenerative disc disease), lumbar   • Lumbar stenosis with neurogenic claudication   • Spinal stenosis of lumbar region with neurogenic claudication   • Sleep apnea   • Hyponatremia   • Encounter for rehabilitation       Therapy Treatment    Rehabilitation Treatment Summary     Row Name 20 0905             Treatment Time/Intention    Discipline  physical therapist  -      Document Type  therapy note (daily note)  -      Subjective Information  complains of;pain  -      Mode of Treatment  physical therapy  -      Patient/Family Observations  Patient seated in chair with ice pack in place.  Patient tearful this morning, embarrased by \"issues with incontinence and making a mess of myself.\"  Patient reports increased pain but would like to try to walk with therapy, declines steps this AM.  -      Patient Effort  good  -      Existing Precautions/Restrictions  brace worn when out of bed;other (see comments) no lifting over 5 lb, no bending or twisting  -      Patient Response to Treatment  Patient with increased c/o pain today, decreased tolerance to mobility.  Patient requests PT return in PM to check on patient and if pain improved, possibly try " stairs again.    -LH      Recorded by [] Dominique Metz, PT 07/28/20 0939      Row Name 07/28/20 0905             Bed Mobility Assessment/Treatment    Comment (Bed Mobility)  deferred - up in chair  -LH      Recorded by [] Dominique Metz, PT 07/28/20 0939      Row Name 07/28/20 0905             Sit-Stand Transfer    Sit-Stand [place] (Transfers)  conditional independence  -      Assistive Device (Sit-Stand Transfers)  walker, front-wheeled  -LH      Recorded by [] Dominique Metz, PT 07/28/20 0939      Row Name 07/28/20 0905             Stand-Sit Transfer    Stand-Sit [place] (Transfers)  conditional independence  -      Assistive Device (Stand-Sit Transfers)  walker, front-wheeled  -LH      Recorded by [] Dominique Metz, PT 07/28/20 0939      Row Name 07/28/20 0905             Gait/Stairs Assessment/Training    [place] Level (Gait)  supervision  -      Assistive Device (Gait)  walker, front-wheeled  -      Distance in Feet (Gait)  200  -      Deviations/Abnormal Patterns (Gait)  gait speed decreased;stride length decreased  -      Comment (Gait/Stairs)  Patient unable to maintain/progress ambulation distance due to pain.  Requests to possibly try stairs in afternoon if pain improved.  Patient able to don back brace prior to mobility with min A from PT to position brace appropriately.  -LH      Recorded by [] Dominique Metz, PT 07/28/20 0939      Row Name 07/28/20 0905             Positioning and Restraints    Pre-Treatment Position  sitting in chair/recliner  -      Post Treatment Position  chair  -LH      In Chair  sitting;call light within reach;encouraged to call for assist;with other staff aide present to clean bed, assist with bath  -LH      Recorded by [] Dominique Metz, PT 07/28/20 0939      Row Name 07/28/20 0905             Pain Scale: Numbers Pre/Post-Treatment    Pain Scale: Numbers, Pretreatment  7/10  -LH      Pain Scale: Numbers,  Post-Treatment  7/10  -      Pain Location  back  -      Pain Intervention(s)  Cold applied;Repositioned  -LH      Recorded by [] Dominique Metz, PT 07/28/20 0939      Row Name                Wound 07/20/20 0803 lumbar spine Incision    Wound - Properties Group Date first assessed: 07/20/20 [HM] Time first assessed: 0803 [HM] Location: lumbar spine [HM] Primary Wound Type: Incision [HM] Recorded by:  [HM] Georgiana Ortiz RN 07/20/20 0819    Row Name                Wound 07/20/20 1140 coccyx    Wound - Properties Group Date first assessed: 07/20/20 [HM] Time first assessed: 1140 [HM] Location: coccyx [HM] Recorded by:  [HM] Georgiana Ortiz RN 07/20/20 1142    Row Name 07/28/20 0905             Plan of Care Review    Plan of Care Reviewed With  patient  -      Outcome Summary  PT:  Patient performs sit to/from stand transfers with conditional independence, using rolling walker.  He was able to don back brace with min A from PT.  Patient ambulated 200 feet with rolling walker with supervision.  Patient with increased c/o back pain today, with decreased tolerance to mobility, requesting PT return in PM to possibly perform stairs if pain improved.  PT in agreement - will follow up with patient this afternoon.  -LH      Recorded by [] Dominique Metz, PT 07/28/20 0939      Row Name 07/28/20 0905             Outcome Summary/Treatment Plan (PT)    Daily Summary of Progress (PT)  progress towards functional goals is fair  -      Barriers to Overall Progress (PT)  increased pain today  -      Plan for Continued Treatment (PT)  check back with patient this PM, practice stairs if pain improved  -      Anticipated Discharge Disposition (PT)  home with home health;home with assist  -      Recorded by [] Dominique Metz, PT 07/28/20 0939        User Key  (r) = Recorded By, (t) = Taken By, (c) = Cosigned By    Initials Name Effective Dates Discipline     Dominique Metz, PT 06/08/18 -  PT     Georgiana Owens RN 06/16/16 -  Nurse          Wound 07/20/20 0803 lumbar spine Incision (Active)   Dressing Appearance dry;intact;no drainage 7/27/2020  8:59 PM   Closure FREIDA 7/27/2020  8:59 PM   Base dressing in place, unable to visualize 7/27/2020  8:59 PM       Wound 07/20/20 1140 coccyx (Active)   Dressing Appearance dry;intact;no drainage 7/27/2020  8:59 PM   Closure FREIDA 7/27/2020  8:59 PM   Base dressing in place, unable to visualize 7/27/2020  8:59 PM           Physical Therapy Education                 Title: PT OT SLP Therapies (Done)     Topic: Physical Therapy (Done)     Point: Mobility training (Done)     Description:   Instruct learner(s) on safety and technique for assisting patient out of bed, chair or wheelchair.  Instruct in the proper use of assistive devices, such as walker, crutches, cane or brace.              Patient Friendly Description:   It's important to get you on your feet again, but we need to do so in a way that is safe for you. Falling has serious consequences, and your personal safety is the most important thing of all.        When it's time to get out of bed, one of us or a family member will sit next to you on the bed to give you support.     If your doctor or nurse tells you to use a walker, crutches, a cane, or a brace, be sure you use it every time you get out of bed, even if you think you don't need it.    Learning Progress Summary           Patient Acceptance, E,TB, VU by  at 7/28/2020 0939    Acceptance, E,TB, VU by  at 7/27/2020 1026    Comment:  reviewed precautions/restrictions of no lifting > 5#, no bending, and no twisting    Acceptance, E,TB, VU by  at 7/26/2020 1201    Comment:  instructed pt on importance of maintaining restrictions of no bending or twisting                   Point: Precautions (Done)     Description:   Instruct learner(s) on prescribed precautions during mobility and gait tasks              Learning Progress Summary           Patient  Acceptance, E,TB, VU by  at 7/27/2020 1026    Comment:  reviewed precautions/restrictions of no lifting > 5#, no bending, and no twisting    Acceptance, E,TB, VU by  at 7/26/2020 1201    Comment:  instructed pt on importance of maintaining restrictions of no bending or twisting                               User Key     Initials Effective Dates Name Provider Type [facility] 06/08/18 -  Dominique Metz, PT Physical Therapist PT     04/06/17 -  Marlon Canales, ELIZABETH Physical Therapist PT                PT Recommendation and Plan  Anticipated Discharge Disposition (PT): home with home health, home with assist  Outcome Summary/Treatment Plan (PT)  Daily Summary of Progress (PT): progress towards functional goals is fair  Barriers to Overall Progress (PT): increased pain today  Plan for Continued Treatment (PT): check back with patient this PM, practice stairs if pain improved  Anticipated Discharge Disposition (PT): home with home health, home with assist  Plan of Care Reviewed With: patient  Outcome Summary: PT:  Patient performs sit to/from stand transfers with conditional independence, using rolling walker.  He was able to don back brace with min A from PT.  Patient ambulated 200 feet with rolling walker with supervision.  Patient with increased c/o back pain today, with decreased tolerance to mobility, requesting PT return in PM to possibly perform stairs if pain improved.  PT in agreement - will follow up with patient this afternoon.     Time Calculation:   PT Charges     Row Name 07/28/20 0940             Time Calculation    Start Time  0905  -      Stop Time  0924  -      Time Calculation (min)  19 min  -         Time Calculation- PT    Total Timed Code Minutes- PT  19 minute(s)  -         Timed Charges    98413 - PT Therapeutic Exercise Minutes  19  -        User Key  (r) = Recorded By, (t) = Taken By, (c) = Cosigned By    Initials Name Provider Type     Dominique Metz, PT Physical  Therapist        Therapy Charges for Today     Code Description Service Date Service Provider Modifiers Qty    [id number]  PT THER PROC EA 15 MIN 7/28/2020 Dominique Metz, PT GP 1               Dominique Metz, PT  7/28/2020        Bed/Stretcher in lowest position, wheels locked, appropriate side rails in place/Call bell, personal items and telephone in reach/Instruct patient to call for assistance before getting out of bed/chair/stretcher/Non-slip footwear applied when patient is off stretcher/[place] to call system/Physically safe environment - no spills, clutter or unnecessary equipment/Purposeful proactive rounding/Room/bathroom lighting operational, light cord in reach

## 2024-03-26 NOTE — ED PROVIDER NOTE - ATTENDING CONTRIBUTION TO CARE
I, Dr. Juany Galarza, have personally performed a face to face medical and diagnostic evaluation of the patient. I have discussed with and reviewed the Resident's and/or ACP's and/or Medical/PA/NP student's note and agree with the History, ROS, Physical Exam and MDM unless otherwise indicated. A brief summary of my personal evaluation and impression can be found below.     HPI: 27yo M with PMH obesity, HTN, pre-DM presents to ED for 2d of palpitations. Feels like heart racing 2d worse in PM and when awakening, mildly discomforted but no SOB, lightheadedness, syncope, jl CP/pressure, abd pain, NVDC. Mother has hx of OPAL, he hasn't been tested. Does endorse inc stress but no heavy caffeine or drug use.    PE: Well appearing, clear lungs, abdomen soft/nontender, no leg swelling, peripheral pulses symmetric bilaterally, mmm.     MDM: Patient with resolved palpitations yesterday and early this mornign at rest. Did have a Monster drink prior and has been stressed studying for exams. Otherwise no social risk factors. EKG here NSR and patient asymptomatic, HD stable, benign exam. Suspect unknown dysrhythmia, possibly sinus v afib, related to energy drink. Will evaluate for electrolyte abnormality vs anemia vs dehydration. Cardiac monitoring and anticipate if all negative will give cardiology follow up for possible Holter monitor. Discussed with patient and in agreement.

## 2024-03-26 NOTE — ED PROVIDER NOTE - OBJECTIVE STATEMENT
27yo M with PMH obesity, HTN, pre-DM presents to ED for 2d of palpitations. Feels like heart racing 2d worse in PM and when awakening, mildly discomforted but no SOB, lightheadedness, syncope, jl CP/pressure, abd pain, NVDC. Mother has hx of OPAL, he hasn't been tested. Does endorse inc stress but no heavy caffeine or drug use.

## 2024-03-26 NOTE — ED PROVIDER NOTE - NSFOLLOWUPINSTRUCTIONS_ED_ALL_ED_FT
Palpitations    A palpitation is the feeling that your heartbeat is irregular or is faster than normal. It may feel like your heart is fluttering or skipping a beat. They may be caused by many things, including smoking, caffeine, alcohol, stress, and certain medicines. Although most causes of palpitations are not serious, palpitations can be a sign of a serious medical problem. Avoid caffeine, alcohol, and tobacco products at home. Try to reduce stress and anxiety and make sure to get plenty of rest.     - We also recommend that you follow up with your primary care (or Pulmonology) for sleep study.  - Follow up with cardiology on discharge.     SEEK IMMEDIATE MEDICAL CARE IF YOU HAVE ANY OF THE FOLLOWING SYMPTOMS: chest pain, shortness of breath, severe headache, dizziness/lightheadedness, or fainting.

## 2024-03-26 NOTE — ED PROVIDER NOTE - ADDITIONAL NOTES AND INSTRUCTIONS:
To Whom it May Concern - Patient seen and evaluated in Emergency Room. Please excuse the above individual for medical care and recovery until date above. Thank you for you care. - Dilia Quiroz MD.

## 2024-03-26 NOTE — ED ADULT NURSE REASSESSMENT NOTE - NS ED NURSE REASSESS COMMENT FT1
Report received merary Argueta RN. Patient currently resting comfortably with no complaints or requests at this time. Pt on continuous cardiac monitor, NSR noted. Comfort care & safety measures continued  IV site looks clean & dry no signs of infiltration noted. Pt awaiting for lab results.

## 2024-03-26 NOTE — ED ADULT NURSE REASSESSMENT NOTE - NS ED NURSE REASSESS COMMENT FT1
Reviewed d/c paperwork with patients, all questions answered at this time. Patient verbalizes understanding to f/u with PCP and return to ED for any worsening symptoms. Patient alert and stable at time of d/c. IV discontinued, cath intact.

## 2024-03-26 NOTE — ED PROVIDER NOTE - NSFOLLOWUPCLINICS_GEN_ALL_ED_FT
Cardiology at [facility]  Cardiology  [address] [place] [address]  Phone: [phone]  Fax:     [facility] Pulmonolgy and Sleep Medicine  Pulmonology  [address] [place] [address]  Phone: [phone]  Fax:

## 2024-03-26 NOTE — ED PROVIDER NOTE - PATIENT PORTAL LINK FT
You can access the FollowMyHealth Patient Portal offered by [facility] by registering at the following website: [email or web address]. By joining [employer]’s FollowMyHealth portal, you will also be able to view your health information using other applications (apps) compatible with our system.

## 2024-03-26 NOTE — ED PROVIDER NOTE - CLINICAL SUMMARY MEDICAL DECISION MAKING FREE TEXT BOX
Richie PGY3: 27yo M with PMH obesity, HTN, pre-DM presents to ED for 2d of palpitations, more court-sleep. Currently regular and ~80s. No known cardiac hx. Never tested for OPAL. Differential Diagnosis includes but not limited to anxiety/stress vs sinus tachy vs arrythmia. Less likely ACS but given risks would screen labs. EKG, CXR. If neg for acute actionable dx - would dc with cards f/u.

## 2024-03-26 NOTE — ED PROVIDER NOTE - PHYSICAL EXAMINATION
CONSTITUTIONAL: NAD, central obesity   SKIN: Warm dry  HEAD: NCAT  EYES: NL inspection  NECK: Supple  CARD: RRR, HR 80s palpated; no prominent murmurs appreciated   RESP: CTAB  ABD: S/NT no R/G  EXT: no LE edema  NEURO: Grossly unremarkable  PSYCH: Cooperative, appropriate.

## 2024-04-01 ENCOUNTER — APPOINTMENT (OUTPATIENT)
Dept: CARDIOLOGY | Facility: CLINIC | Age: 29
End: 2024-04-01

## 2024-05-25 ENCOUNTER — EMERGENCY (EMERGENCY)
Facility: HOSPITAL | Age: 29
LOS: 1 days | Discharge: HOME | End: 2024-05-25
Attending: STUDENT IN AN ORGANIZED HEALTH CARE EDUCATION/TRAINING PROGRAM
Payer: MEDICAID

## 2024-05-25 VITALS
HEART RATE: 104 BPM | TEMPERATURE: 99 F | DIASTOLIC BLOOD PRESSURE: 100 MMHG | OXYGEN SATURATION: 98 % | RESPIRATION RATE: 18 BRPM | SYSTOLIC BLOOD PRESSURE: 139 MMHG

## 2024-05-25 VITALS
DIASTOLIC BLOOD PRESSURE: 80 MMHG | RESPIRATION RATE: 18 BRPM | HEART RATE: 96 BPM | SYSTOLIC BLOOD PRESSURE: 116 MMHG | OXYGEN SATURATION: 99 %

## 2024-05-25 PROBLEM — I10 ESSENTIAL (PRIMARY) HYPERTENSION: Chronic | Status: ACTIVE | Noted: 2024-03-26

## 2024-05-25 PROCEDURE — 99283 EMERGENCY DEPT VISIT LOW MDM: CPT | Mod: 25

## 2024-05-25 PROCEDURE — 73564 X-RAY EXAM KNEE 4 OR MORE: CPT | Mod: 26,RT

## 2024-05-25 PROCEDURE — 73564 X-RAY EXAM KNEE 4 OR MORE: CPT

## 2024-05-25 PROCEDURE — 99284 EMERGENCY DEPT VISIT MOD MDM: CPT

## 2024-05-25 RX ORDER — IBUPROFEN 200 MG
400 TABLET ORAL ONCE
Refills: 0 | Status: COMPLETED | OUTPATIENT
Start: 2024-05-25 | End: 2024-05-25

## 2024-05-25 RX ADMIN — Medication 400 MILLIGRAM(S): at 19:53

## 2024-05-25 NOTE — ED PROVIDER NOTE - PATIENT PORTAL LINK FT
You can access the FollowMyHealth Patient Portal offered by [facility] by registering at the following website: [email or web address]. By joining [email or web address]’s FollowMyHealth portal, you will also be able to view your health information using other applications (apps) compatible with our system.

## 2024-05-25 NOTE — ED ADULT NURSE NOTE - NSFALLUNIVINTERV_ED_ALL_ED
Bed/Stretcher in lowest position, wheels locked, appropriate side rails in place/Call bell, personal items and telephone in reach/Instruct patient to call for assistance before getting out of bed/chair/stretcher/Non-slip footwear applied when patient is off stretcher/[place] to call system/Physically safe environment - no spills, clutter or unnecessary equipment/Purposeful proactive rounding/Room/bathroom lighting operational, light cord in reach

## 2024-05-25 NOTE — ED PROVIDER NOTE - ATTENDING CONTRIBUTION TO CARE
R knee injury while playing basketball, pt unable to bear weight since the injury due to pain in the R leg, pt w/ no head strike no loc, reports he tripped over himself and felt pop and fell, pt w/ no open wounds, fall at approx 3 iced the area. pt w/ no headache, no numbness in the lower leg, pt w/ no open wound ont eh knee, has lateral patella tenderness, joint appears intact, no laxity of the patella, pt w/ limited flexion of the knee due to pain. pt w/ improvement in sx w/ ice and elevation, 2+ PT pulse b/l, intact sensation in the lower leg b/l, pt w/ no hip pain b/l, pt soft abdomen, noncephalic atraumatic, pt to have xray of the knee and likely dx w/ knee immobilization.

## 2024-05-25 NOTE — ED PROVIDER NOTE - OBJECTIVE STATEMENT
28-year-old male with medical history of diabetes and hypertension, who presents today with knee pain.  He says that he lunged forward on plain basketball, heard a pop of his right knee, then noticed it was deformed.  Says he has severe pain if his knee is bent at all, but his pain is essentially minimal if he keeps his knee straight.    On exam he has reproducible pain with bending of his knee, with also pain on palpation of the medial aspect of his knee as well as the anterior aspect below and above the patella.  No obvious deformity of the knee.  Negative anterior and posterior drawer test.    Medications include metformin, glimepiride, and lisinopril.  No allergies.

## 2024-05-25 NOTE — ED PROVIDER NOTE - NSFOLLOWUPCLINICS_GEN_ALL_ED_FT
[facility] Orthopedic Surgery  Orthopedic Surgery  [address], 3rd & 4th [place] [address]  Phone: [phone]  Fax:

## 2024-05-25 NOTE — ED ADULT NURSE NOTE - OBJECTIVE STATEMENT
29 yo male PMH HTN, DM, patella tendon repair, A&ox3, presents to ED c/o right knee pain.  pt reports around 2pm today, was playing basketball when he went to move forward on right leg and felt a pop to right knee, painful to walk.  Breathing even and unlabored, abdomen soft nontender, swelling noted to right knee, pulses ,motor, sensory intact. Pt denies chest pain, palpitations, shortness of breath, headache, visual disturbances, numbness/tingling, fever, chills, diaphoresis,  nausea, vomiting, constipation, diarrhea, or urinary symptoms.

## 2024-05-25 NOTE — ED PROVIDER NOTE - CLINICAL SUMMARY MEDICAL DECISION MAKING FREE TEXT BOX
28M hx dm, htn with knee pain after playing basketball.  The differential diagnosis includes but is not limited to ligamental or tendon injury, fracture significantly less likely.  Will get Xray, discuss with ortho and likely follow up with them in clinic.

## 2024-05-25 NOTE — ED PROVIDER NOTE - PHYSICAL EXAMINATION
Vital signs reviewed.  CONSTITUTIONAL: Well appearing in NAD  HEAD: Normocephalic; atraumatic  NECK: Trachea midline  CV: Normal S1, S2; extremities WWP  RESP: normal work of breathing; no stridor  MSK/EXT: see hpi  SKIN: Warm and dry as visualized  NEURO: A&O, appears non-focal

## 2024-05-25 NOTE — ED PROVIDER NOTE - NSFOLLOWUPINSTRUCTIONS_ED_ALL_ED_FT
We evaluated you in the emergency room for your knee pain, we recommend that you use knee immobilizer and crutches until you follow up with the orthopedic doctor.   Please follow up with the doctor listed below   We recommend that you rest, ice, elevate the knee. Take tylenol(650 mg up to three times a day)/motrin(600 mg up to three times a day) for your symptoms.     After 48 hours please use heat on the area that is hurting.     If you develop numbness, weakness, change in color of your extremities (turn blue or white), worsening pain please seek immediate medical care for repeat evaluation.

## 2024-05-28 ENCOUNTER — APPOINTMENT (OUTPATIENT)
Dept: ORTHOPEDIC SURGERY | Facility: CLINIC | Age: 29
End: 2024-05-28
Payer: MEDICAID

## 2024-05-28 ENCOUNTER — NON-APPOINTMENT (OUTPATIENT)
Age: 29
End: 2024-05-28

## 2024-05-28 VITALS — WEIGHT: 295 LBS | BODY MASS INDEX: 42.23 KG/M2 | HEIGHT: 70 IN

## 2024-05-28 PROCEDURE — 99204 OFFICE O/P NEW MOD 45 MIN: CPT

## 2024-05-28 NOTE — RETURN TO WORK/SCHOOL
[FreeTextEntry1] : Ace was seen today for evaluation management of acute right knee orthopedic injury.  He is unable to ambulate at this time.  He requires MRI to evaluate for extent of injury and is a likely candidate for surgical repair as his clinical exam is concerning for acute tendon rupture.  Please excuse him from his work duty for the next 4 weeks while he undergoes this initial assessment and determination of need for surgical intervention. Should you have any questions please call the office at [phone] Thank you for your understanding.     Hussain Cervantes DO, ATC Primary Care Sports Medicine [facility] Orthopaedic [place]

## 2024-05-28 NOTE — DISCUSSION/SUMMARY
[de-identified] : Discussed findings of today's exam and possible causes of patient's pain.  Educated patient on their most probable diagnosis of acute patellar tendon rupture of the right knee.  Patient is already status post surgical repair of left patellar tendon rupture in 2019.  Reviewed possible courses of treatment, and we collaboratively decided best course of treatment at this time will include advanced imaging with MRI to evaluate for patella tendon rupture.  Patient will be maintained within his knee immobilizer and crutches.  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  This represents an acute complicated injury of uncertain prognosis.  Patient is advised we can review MRI results over the phone as soon as available, if patella tendon rupture is confirmed he will need timely consultation with one of my orthopedic associates to discuss risk/benefits of surgical repair.  Patient appreciates and agrees with current plan.  This note was generated using dragon medical dictation software.  A reasonable effort has been made for proofreading its contents, but typos may still remain.  If there are any questions or points of clarification needed please notify my office.

## 2024-05-28 NOTE — PHYSICAL EXAM
[de-identified] : Constitutional: Well-nourished, well-developed, No acute distress Respiratory:  Good respiratory effort, no SOB Lymphatic: No regional lymphadenopathy, no lymphedema Psychiatric: Pleasant and normal affect, alert and oriented x3 Musculoskeletal: normal except where as noted in regional exam  Right knee: APPEARANCE: + swelling overlying prepatellar space, + patella davina, no other marked deformities or malalignment POSITIVE TENDERNESS: + TTP of prepatellar area, distal quadriceps tendon, and proximal patellar tendon NONTENDER: jt lines b/l & retinacula b/l, MCL/LCL, ITB at the lateral femoral condyle & Gerdy's tubercle, pes bursa.  ROM: full passive extension but limited active extension to 20, pain with passive knee flexion over anterior knee RESISTIVE TESTING: + Pain and noted weakness of resisted knee extension, + weakness 2/5 extensor mechanism, painless resisted knee flexion   [de-identified] : I reviewed, interpreted and clinically correlated the following outside imaging studies, In system x-rays, 3 views of the right knee, evidence of a significantly high riding patella, and noted calcific deposit seen on the medial aspect of the knee consistent with MCL enthesopathy

## 2024-05-28 NOTE — HISTORY OF PRESENT ILLNESS
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 He went to the ED xrays were taken which were negative for fracture but positive for patella davina. He was placed in a knee immobilizer at that time. He has a history of left knee patella tendon fracture several years ago sustained at work.

## 2024-05-29 PROBLEM — E11.9 TYPE 2 DIABETES MELLITUS WITHOUT COMPLICATIONS: Chronic | Status: ACTIVE | Noted: 2024-05-25

## 2024-05-30 ENCOUNTER — APPOINTMENT (OUTPATIENT)
Dept: MRI IMAGING | Facility: CLINIC | Age: 29
End: 2024-05-30
Payer: MEDICAID

## 2024-05-30 PROCEDURE — 73721 MRI JNT OF LWR EXTRE W/O DYE: CPT | Mod: RT

## 2024-05-31 ENCOUNTER — TRANSCRIPTION ENCOUNTER (OUTPATIENT)
Age: 29
End: 2024-05-31

## 2024-05-31 ENCOUNTER — NON-APPOINTMENT (OUTPATIENT)
Age: 29
End: 2024-05-31

## 2024-05-31 ENCOUNTER — APPOINTMENT (OUTPATIENT)
Dept: ORTHOPEDIC SURGERY | Facility: CLINIC | Age: 29
End: 2024-05-31
Payer: MEDICAID

## 2024-05-31 VITALS — WEIGHT: 295 LBS | HEIGHT: 70 IN | BODY MASS INDEX: 42.23 KG/M2

## 2024-05-31 PROCEDURE — 99214 OFFICE O/P EST MOD 30 MIN: CPT

## 2024-05-31 NOTE — HISTORY OF PRESENT ILLNESS
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 He went to the ED xrays were taken which were negative for fracture but positive for patella davina. He was placed in a knee immobilizer at that time. He has a history of left knee patella tendon fracture several years ago sustained at work.  Seen by Dr Cervantes, here today for further evaluation.   The patient's past medical history, past surgical history, medications, allergies, and social history were reviewed by me today with the patient and documented accordingly. In addition, the patient's family history, which is noncontributory to this visit, was also reviewed.

## 2024-05-31 NOTE — DISCUSSION/SUMMARY
[de-identified] : 28-year-old male history of a left knee patella tendon rupture requiring revision in the early postoperative period with a right knee patella tendon rupture.  We discussed the nature of this injury and recommend surgical treatment with right knee patella tendon repair with possible graft augmentation.  Risks benefits alternatives were discussed at length including not limited to risk such as bleeding infection nerve or vessel injury continued pain stiffness need for future surgery medical complications such as DVT or PE and anesthesia complications.  All questions were answered.  He is requesting be placed in a long-leg cast postoperatively given the issues that happen after his first surgery he will schedule at his convenience

## 2024-05-31 NOTE — PHYSICAL EXAM
[de-identified] : General Exam  Well developed, well nourished  No apparent distress  Oriented to person, place, and time  Mood: Normal  Affect: Normal  Balance and coordination: Normal   right knee exam    Skin: Clean, dry, intact Inspection, no masses, + swelling, + effusion.  +patella davina  Tenderness: + MJLT.+ LJLT. No tenderness over the medial and lateral patella facets. No ttp medial/lateral epicondyle, +ttp patella tendon, tibial tubercle, pes anserinus, or proximal fibula.  Strength: 3/5 Q/H/TA/GS/EHL, no atrophy  Neuro: In tact to light touch throughout in dp/sp/tib/shannon/saph nerve districutions,  DTR's normal Pulses: 2+ DP/PT pulses.  [de-identified] : Radiographs MRI obtained outside reviewed there is a tear of the proximal patellar tendon and significant patella tendinopathy there is an effusion

## 2024-06-03 ENCOUNTER — OUTPATIENT (OUTPATIENT)
Dept: OUTPATIENT SERVICES | Facility: HOSPITAL | Age: 29
LOS: 1 days | Discharge: HOME | End: 2024-06-03

## 2024-06-03 VITALS
WEIGHT: 294.98 LBS | HEART RATE: 110 BPM | RESPIRATION RATE: 18 BRPM | TEMPERATURE: 98 F | OXYGEN SATURATION: 98 % | DIASTOLIC BLOOD PRESSURE: 79 MMHG | HEIGHT: 70 IN | SYSTOLIC BLOOD PRESSURE: 128 MMHG

## 2024-06-03 DIAGNOSIS — I10 ESSENTIAL (PRIMARY) HYPERTENSION: ICD-10-CM

## 2024-06-03 DIAGNOSIS — S86.811A STRAIN OF OTHER MUSCLE(S) AND TENDON(S) AT LOWER LEG LEVEL, RIGHT LEG, INITIAL ENCOUNTER: ICD-10-CM

## 2024-06-03 DIAGNOSIS — Z01.818 ENCOUNTER FOR OTHER PREPROCEDURAL EXAMINATION: ICD-10-CM

## 2024-06-03 DIAGNOSIS — E11.9 TYPE 2 DIABETES MELLITUS WITHOUT COMPLICATIONS: ICD-10-CM

## 2024-06-03 DIAGNOSIS — M22.3X2 OTHER DERANGEMENTS OF PATELLA, LEFT KNEE: Chronic | ICD-10-CM

## 2024-06-03 LAB
ANION GAP SERPL CALC-SCNC: 9 MMOL/L — SIGNIFICANT CHANGE UP (ref 5–17)
BUN SERPL-MCNC: 12 MG/DL — SIGNIFICANT CHANGE UP (ref 7–23)
CALCIUM SERPL-MCNC: 9.2 MG/DL — SIGNIFICANT CHANGE UP (ref 8.5–10.1)
CHLORIDE SERPL-SCNC: 105 MMOL/L — SIGNIFICANT CHANGE UP (ref 96–108)
CO2 SERPL-SCNC: 20 MMOL/L — LOW (ref 22–31)
CREAT SERPL-MCNC: 0.98 MG/DL — SIGNIFICANT CHANGE UP (ref 0.5–1.3)
EGFR: 106 ML/MIN/1.73M2 — SIGNIFICANT CHANGE UP
GLUCOSE SERPL-MCNC: 206 MG/DL — HIGH (ref 70–99)
HCT VFR BLD CALC: 42.8 % — SIGNIFICANT CHANGE UP (ref 39–50)
HGB BLD-MCNC: 13.8 G/DL — SIGNIFICANT CHANGE UP (ref 13–17)
MCHC RBC-ENTMCNC: 23.5 PG — LOW (ref 27–34)
MCHC RBC-ENTMCNC: 32.2 G/DL — SIGNIFICANT CHANGE UP (ref 32–36)
MCV RBC AUTO: 72.9 FL — LOW (ref 80–100)
NRBC # BLD: 0 /100 WBCS — SIGNIFICANT CHANGE UP (ref 0–0)
PLATELET # BLD AUTO: 359 K/UL — SIGNIFICANT CHANGE UP (ref 150–400)
POTASSIUM SERPL-MCNC: 4.3 MMOL/L — SIGNIFICANT CHANGE UP (ref 3.5–5.3)
POTASSIUM SERPL-SCNC: 4.3 MMOL/L — SIGNIFICANT CHANGE UP (ref 3.5–5.3)
RBC # BLD: 5.87 M/UL — HIGH (ref 4.2–5.8)
RBC # FLD: 14.1 % — SIGNIFICANT CHANGE UP (ref 10.3–14.5)
SODIUM SERPL-SCNC: 134 MMOL/L — LOW (ref 135–145)
WBC # BLD: 8.11 K/UL — SIGNIFICANT CHANGE UP (ref 3.8–10.5)
WBC # FLD AUTO: 8.11 K/UL — SIGNIFICANT CHANGE UP (ref 3.8–10.5)

## 2024-06-03 NOTE — H&P PST ADULT - PROBLEM SELECTOR PLAN 1
Labs-CBC, BMP, EKG     Preop Hibiclens x 1 day instructions reviewed and given.  Take routine meds DOS with small sips of water, avoid NSAIDs and OTC supplements  Anesthesiologist to review PST labs, EKG, required clearances, and optimization for surgery

## 2024-06-03 NOTE — H&P PST ADULT - ASSESSMENT
28M PMH HTN, DM presents to PST for scheduled right knee patella tendon repair on 24 with Dr. Cohn CAPRINI SCORE    AGE RELATED RISK FACTORS                                                             [ ] Age 41-60 years                                            (1 Point)  [ ] Age: 61-74 years                                           (2 Points)                 [ ] Age= 75 years                                                (3 Points)             DISEASE RELATED RISK FACTORS                                                       [ ] Edema in the lower extremities                 (1 Point)                     [ ] Varicose veins                                               (1 Point)                                 [x ] BMI > 25 Kg/m2                                            (1 Point)                                  [ ] Serious infection (ie PNA)                            (1 Point)                     [ ] Lung disease ( COPD, Emphysema)            (1 Point)                                                                          [ ] Acute myocardial infarction                         (1 Point)                  [ ] Congestive heart failure (in the previous month)  (1 Point)         [ ] Inflammatory bowel disease                            (1 Point)                  [ ] Central venous access, PICC or Port               (2 points)       (within the last month)                                                                [ ] Stroke (in the previous month)                        (5 Points)    [ ] Previous or present malignancy                       (2 points)                                                                                                                                                         HEMATOLOGY RELATED FACTORS                                                         [ ] Prior episodes of VTE                                     (3 Points)                     [ ] Positive family history for VTE                      (3 Points)                  [ ] Prothrombin 44020 A                                     (3 Points)                     [ ] Factor V Leiden                                                (3 Points)                        [ ] Lupus anticoagulants                                      (3 Points)                                                           [ ] Anticardiolipin antibodies                              (3 Points)                                                       [ ] High homocysteine in the blood                   (3 Points)                                             [ ] Other congenital or acquired thrombophilia      (3 Points)                                                [ ] Heparin induced thrombocytopenia                  (3 Points)                                        MOBILITY RELATED FACTORS  [ ] Bed rest                                                         (1 Point)  [ ] Plaster cast                                                    (2 points)  [ ] Bed bound for more than 72 hours           (2 Points)    GENDER SPECIFIC FACTORS  [ ] Pregnancy or had a baby within the last month   (1 Point)  [ ] Post-partum < 6 weeks                                   (1 Point)  [ ] Hormonal therapy  or oral contraception   (1 Point)  [ ] History of pregnancy complications              (1 point)  [ ] Unexplained or recurrent              (1 Point)    OTHER RISK FACTORS                                           (1 Point)  [ ] BMI >40, smoking, diabetes requiring insulin, chemotherapy  blood transfusions and length of surgery over 2 hours    SURGERY RELATED RISK FACTORS  [ ]  Section within the last month     (1 Point)  [ ] Minor surgery                                                  (1 Point)  [ ] Arthroscopic surgery                                       (2 Points)  [x ] Planned major surgery lasting more            (2 Points)      than 45 minutes     [ ] Elective hip or knee joint replacement       (5 points)       surgery                                                TRAUMA RELATED RISK FACTORS  [ ] Fracture of the hip, pelvis, or leg                       (5 Points)  [ ] Spinal cord injury resulting in paralysis             (5 points)       (in the previous month)    [ ] Paralysis  (less than 1 month)                             (5 Points)  [ ] Multiple Trauma within 1 month                        (5 Points)    Total Score [     3   ]    Caprini Score 0-2: Low Risk, NO VTE prophylaxis required for most patients, encourage ambulation  Caprini Score 3-6: Moderate Risk , pharmacologic VTE prophylaxis is indicated for most patients (in the absence of contraindications)  Caprini Score Greater than or =7: High risk, pharmocologic VTE prophylaxis indicated for most patients (in the absence of contraindications)

## 2024-06-03 NOTE — H&P PST ADULT - HISTORY OF PRESENT ILLNESS
28M PMH HTN, DM presents to PST for scheduled right knee patella tendon repair on 6/6/24 with Dr. Miller

## 2024-06-03 NOTE — H&P PST ADULT - NEGATIVE GENERAL GENITOURINARY SYMPTOMS
[facility], [facility] Podiatry  Progress Note    Brandon Rodriguez is a 59year old female. Patient presents with:  Callus: Consult - left foot has been there for years - using pumice on it but feels like something is in there - pain rated at 5-10/10 intermittent, mostly when standing or walking. Diabetic Foot Care: Consult - last A1C 7.5 on 11/29/22 - did not check sugar this am - numbness and tingling bilateral, left is worse        HPI:     This is a pleasant type 2 diabetic female with tobacco abuse smokes 5-10 cigs/day, COPD. She does complain numbness burning and tingling to her feet. She presents to clinic today for diabetic foot exam.  She complains of a painful callus on the bottom of her left foot. Allergies: Patient has no known allergies. Current Outpatient Medications   Medication Sig Dispense Refill    losartan 25 MG Oral Tab Take 1 tablet (25 mg total) by mouth daily. atorvastatin 10 MG Oral Tab Take 1 tablet (10 mg total) by mouth daily. metFORMIN 500 MG Oral Tab Take 1 tablet (500 mg total) by mouth 2 (two) times daily with meals. 180 tablet 3    albuterol 108 (90 Base) MCG/ACT Inhalation Aero Soln Inhale 2 puffs into the lungs every 4 (four) hours as needed for Wheezing or Shortness of Breath. 18 g 5    dicyclomine 10 MG Oral Cap Take 1 capsule (10 mg total) by mouth 4 (four) times daily before meals and nightly. (Patient not taking: Reported on 10/10/2023) 120 capsule 1    docusate sodium 100 MG Oral Cap Take 1 capsule (100 mg total) by mouth 2 (two) times daily as needed for constipation. (Patient not taking: Reported on 10/10/2023) 100 capsule 2    IBUPROFEN OR as needed.  (Patient not taking: Reported on 10/10/2023)        Past Medical History:   Diagnosis Date    Anxiety     Asthma, mild intermittent     Back pain     Hiatal hernia     Kidney stones     Large for gestational age (LGA)     failure to progress    Miscarriage 2005    Palpitations     Pregnancy 2004 stillborn at 25 weeks      Past Surgical History:   Procedure Laterality Date      0    LAPAROSCOPY,DIAGNOSTIC      LITHOTRIPSY      nephrolithiasis      Family History   Problem Relation Age of Onset    Heart Disease Father         CAD    Uterine Cancer Maternal Grandmother       Social History    Socioeconomic History      Marital status: Single    Tobacco Use      Smoking status: Every Day        Packs/day: 0.50        Years: 28.00        Additional pack years: 0.00        Total pack years: 14.00        Types: Cigarettes      Smokeless tobacco: Never    Vaping Use      Vaping Use: Never used    Substance and Sexual Activity      Alcohol use: Not Currently        Alcohol/week: 0.0 standard drinks of alcohol        Comment: weekly      Drug use: Yes        Types: Hydrocodone          REVIEW OF SYSTEMS:   Denies nausea, fever, chills  No calf pain  No other muscle or joint aches  Denies chest pain or shortness of breath. EXAM:   There were no vitals taken for this visit. Constitutional:   Patient in no apparent distress. Well kept. Of normal body habitus. Alert and oriented to person, place, and time. Vascular Examination:  DP pulse is 2/4  PT pulse is 2/4  Capillary refill is immediate  Integumentary Examination:   Digital hair growth is present left and is present right. Left sub 2nd met head callus  Neurological Examination:  Monofilament (10-g) sensation is 5/5 to right and 5/5 to left. Sharp/dull is present to right and is present to left. Parasthesias present both feet  Musculoskeletal Examination:  Muscle Strength is 5/5.   POP to plantarflexed left sub 2nd met head      LABS & IMAGING:     Lab Results   Component Value Date     (H) 2022    BUN 11 2022    CREATSERUM 0.72 2022    BUNCREA 15.3 2022    ANIONGAP 4 2022    GFRAA 115 2022    GFRNAA 100 2022    CA 9.5 2022     2022    K 4.9 2022     2022 CO2 32.0 11/29/2022    OSMOCALC 294 11/29/2022        Lab Results   Component Value Date     (H) 11/29/2022    A1C 6.7 (A) 01/11/2023        No results found. ASSESSMENT AND PLAN:   Diagnoses and all orders for this visit:    Foot callus    Plantar flexed metatarsal bone of left foot    Left foot pain    Type 2 diabetes mellitus with polyneuropathy ([facility] [address]        Plan:     Diabetic education and instructions have been provided. We reviewed and discussed the following:    -risk categories related to pts with diabetes and foot or lower extremity complications per ADA. -adherence to medication regimen and close monitoring or blood sugar control.   -daily monitoring/inspection of feet and shoes.   -proper management of diet and weight   -regular follow up with PCP and specialty providers as recommended   -Lower extremity complications related to DM were reviewed and stressed prevention. Evaluated patient. Discussed treatment options with patient. Discussed proper hygiene and care for feet as well as use of emollient creams (ie Urea based creams)  Answered all patient questions. Discussed offloading hyperkeratotic lesions with proper shoe gear, offloading pads, and insoles. Procedures: (CPT 16048 Paring or cutting of benign hyperkeratotic lesion)  One lesion pared utilizing #15 blade left foot without incident. Pt would like to hold off on xrays with marker to ensure callus is coming from the sub 2nd met head pressure. Prescribed DM shoes with inserts and left sub 2nd met head offloading  RTC 3 months for re evaluation. If no improvement will re discuss xrays. Will also discuss annual CDFE. No follow-ups on file.     Leoncio Ca DPM  10/10/2023 no hematuria/no nocturia

## 2024-06-04 LAB
A1C WITH ESTIMATED AVERAGE GLUCOSE RESULT: 8.6 % — HIGH (ref 4–5.6)
ESTIMATED AVERAGE GLUCOSE: 200 MG/DL — HIGH (ref 68–114)

## 2024-06-05 ENCOUNTER — TRANSCRIPTION ENCOUNTER (OUTPATIENT)
Age: 29
End: 2024-06-05

## 2024-06-06 ENCOUNTER — APPOINTMENT (OUTPATIENT)
Dept: ORTHOPEDIC SURGERY | Facility: HOSPITAL | Age: 29
End: 2024-06-06

## 2024-06-06 ENCOUNTER — TRANSCRIPTION ENCOUNTER (OUTPATIENT)
Age: 29
End: 2024-06-06

## 2024-06-06 ENCOUNTER — OUTPATIENT (OUTPATIENT)
Dept: OUTPATIENT SERVICES | Facility: HOSPITAL | Age: 29
LOS: 1 days | Discharge: HOME | End: 2024-06-06
Payer: MEDICAID

## 2024-06-06 VITALS
RESPIRATION RATE: 17 BRPM | OXYGEN SATURATION: 96 % | HEART RATE: 103 BPM | DIASTOLIC BLOOD PRESSURE: 79 MMHG | TEMPERATURE: 98 F | SYSTOLIC BLOOD PRESSURE: 132 MMHG

## 2024-06-06 VITALS
HEART RATE: 109 BPM | DIASTOLIC BLOOD PRESSURE: 86 MMHG | SYSTOLIC BLOOD PRESSURE: 132 MMHG | HEIGHT: 70 IN | RESPIRATION RATE: 16 BRPM | WEIGHT: 294.98 LBS | TEMPERATURE: 98 F | OXYGEN SATURATION: 98 %

## 2024-06-06 DIAGNOSIS — M22.3X2 OTHER DERANGEMENTS OF PATELLA, LEFT KNEE: Chronic | ICD-10-CM

## 2024-06-06 LAB
GLUCOSE BLDC GLUCOMTR-MCNC: 132 MG/DL — HIGH (ref 70–99)
GLUCOSE BLDC GLUCOMTR-MCNC: 228 MG/DL — HIGH (ref 70–99)

## 2024-06-06 PROCEDURE — 27381 REPAIR/GRAFT KNEECAP TENDON: CPT | Mod: RT

## 2024-06-06 RX ORDER — GLIMEPIRIDE 1 MG
1 TABLET ORAL
Refills: 0 | DISCHARGE

## 2024-06-06 RX ORDER — ONDANSETRON 8 MG/1
4 TABLET, FILM COATED ORAL ONCE
Refills: 0 | Status: DISCONTINUED | OUTPATIENT
Start: 2024-06-06 | End: 2024-06-07

## 2024-06-06 RX ORDER — ONDANSETRON 8 MG/1
1 TABLET, FILM COATED ORAL
Qty: 21 | Refills: 0
Start: 2024-06-06 | End: 2024-06-12

## 2024-06-06 RX ORDER — OXYCODONE HYDROCHLORIDE 5 MG/1
1 TABLET ORAL
Qty: 28 | Refills: 0
Start: 2024-06-06 | End: 2024-06-12

## 2024-06-06 RX ORDER — HYDROMORPHONE HYDROCHLORIDE 2 MG/ML
1 INJECTION INTRAMUSCULAR; INTRAVENOUS; SUBCUTANEOUS
Refills: 0 | Status: DISCONTINUED | OUTPATIENT
Start: 2024-06-06 | End: 2024-06-07

## 2024-06-06 RX ORDER — LISINOPRIL 2.5 MG/1
1 TABLET ORAL
Refills: 0 | DISCHARGE

## 2024-06-06 RX ORDER — ASPIRIN/CALCIUM CARB/MAGNESIUM 324 MG
1 TABLET ORAL
Qty: 30 | Refills: 0
Start: 2024-06-06 | End: 2024-07-05

## 2024-06-06 RX ORDER — SODIUM CHLORIDE 9 MG/ML
1000 INJECTION, SOLUTION INTRAVENOUS
Refills: 0 | Status: DISCONTINUED | OUTPATIENT
Start: 2024-06-06 | End: 2024-06-07

## 2024-06-06 RX ORDER — SODIUM CHLORIDE 9 MG/ML
3 INJECTION INTRAMUSCULAR; INTRAVENOUS; SUBCUTANEOUS EVERY 8 HOURS
Refills: 0 | Status: DISCONTINUED | OUTPATIENT
Start: 2024-06-06 | End: 2024-06-06

## 2024-06-06 RX ORDER — DOCUSATE SODIUM 100 MG
1 CAPSULE ORAL
Qty: 14 | Refills: 0
Start: 2024-06-06 | End: 2024-06-12

## 2024-06-06 RX ORDER — METFORMIN HYDROCHLORIDE 850 MG/1
1 TABLET ORAL
Refills: 0 | DISCHARGE

## 2024-06-06 RX ORDER — HYDROMORPHONE HYDROCHLORIDE 2 MG/ML
0.5 INJECTION INTRAMUSCULAR; INTRAVENOUS; SUBCUTANEOUS
Refills: 0 | Status: DISCONTINUED | OUTPATIENT
Start: 2024-06-06 | End: 2024-06-07

## 2024-06-06 RX ADMIN — HYDROMORPHONE HYDROCHLORIDE 1 MILLIGRAM(S): 2 INJECTION INTRAMUSCULAR; INTRAVENOUS; SUBCUTANEOUS at 16:22

## 2024-06-06 RX ADMIN — HYDROMORPHONE HYDROCHLORIDE 1 MILLIGRAM(S): 2 INJECTION INTRAMUSCULAR; INTRAVENOUS; SUBCUTANEOUS at 17:10

## 2024-06-06 RX ADMIN — SODIUM CHLORIDE 100 MILLILITER(S): 9 INJECTION, SOLUTION INTRAVENOUS at 16:00

## 2024-06-06 RX ADMIN — HYDROMORPHONE HYDROCHLORIDE 1 MILLIGRAM(S): 2 INJECTION INTRAMUSCULAR; INTRAVENOUS; SUBCUTANEOUS at 15:49

## 2024-06-06 RX ADMIN — HYDROMORPHONE HYDROCHLORIDE 1 MILLIGRAM(S): 2 INJECTION INTRAMUSCULAR; INTRAVENOUS; SUBCUTANEOUS at 16:52

## 2024-06-06 NOTE — ASU DISCHARGE PLAN (ADULT/PEDIATRIC) - CARE PROVIDER_API CALL
Kentrell Miller  Orthopaedic Surgery  [address], [address]  [place] [address]  Phone: [phone]  Fax: [phone]  Follow Up Time:

## 2024-06-06 NOTE — ASU DISCHARGE PLAN (ADULT/PEDIATRIC) - NS MD DC FALL RISK RISK
For information on Fall & Injury Prevention, visit: [email or web address] OR  [email or web address] OR  https://www.cdc.gov/steadi/patient.html

## 2024-06-06 NOTE — ASU PATIENT PROFILE, ADULT - FALL HARM RISK - HARM RISK INTERVENTIONS
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
## 2024-06-11 DIAGNOSIS — S76.111A STRAIN OF RIGHT QUADRICEPS MUSCLE, FASCIA AND TENDON, INITIAL ENCOUNTER: ICD-10-CM
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
## 2024-06-11 DIAGNOSIS — Z79.84 LONG TERM (CURRENT) USE OF ORAL HYPOGLYCEMIC DRUGS: ICD-10-CM

## 2024-06-11 DIAGNOSIS — E11.9 TYPE 2 DIABETES MELLITUS WITHOUT COMPLICATIONS: ICD-10-CM

## 2024-06-11 DIAGNOSIS — I10 ESSENTIAL (PRIMARY) HYPERTENSION: ICD-10-CM

## 2024-06-16 ENCOUNTER — NON-APPOINTMENT (OUTPATIENT)
Age: 29
End: 2024-06-16

## 2024-06-18 ENCOUNTER — APPOINTMENT (OUTPATIENT)
Dept: ORTHOPEDIC SURGERY | Facility: CLINIC | Age: 29
End: 2024-06-18
Payer: MEDICAID

## 2024-06-18 DIAGNOSIS — S86.811A STRAIN OF OTHER MUSCLE(S) AND TENDON(S) AT LOWER LEG LEVEL, RIGHT LEG, INITIAL ENCOUNTER: ICD-10-CM

## 2024-06-18 PROCEDURE — 99024 POSTOP FOLLOW-UP VISIT: CPT

## 2024-07-08 ENCOUNTER — NON-APPOINTMENT (OUTPATIENT)
Age: 29
End: 2024-07-08

## 2024-07-09 ENCOUNTER — APPOINTMENT (OUTPATIENT)
Dept: ORTHOPEDIC SURGERY | Facility: CLINIC | Age: 29
End: 2024-07-09
Payer: MEDICAID

## 2024-07-09 VITALS — HEIGHT: 70 IN | WEIGHT: 290 LBS | BODY MASS INDEX: 41.52 KG/M2

## 2024-07-09 DIAGNOSIS — S86.811A STRAIN OF OTHER MUSCLE(S) AND TENDON(S) AT LOWER LEG LEVEL, RIGHT LEG, INITIAL ENCOUNTER: ICD-10-CM

## 2024-07-09 PROCEDURE — 99024 POSTOP FOLLOW-UP VISIT: CPT

## 2024-07-26 ENCOUNTER — NON-APPOINTMENT (OUTPATIENT)
Age: 29
End: 2024-07-26

## 2024-08-06 ENCOUNTER — APPOINTMENT (OUTPATIENT)
Dept: ORTHOPEDIC SURGERY | Facility: CLINIC | Age: 29
End: 2024-08-06

## 2024-08-06 PROCEDURE — 99024 POSTOP FOLLOW-UP VISIT: CPT

## 2024-08-06 NOTE — HISTORY OF PRESENT ILLNESS
[de-identified] : R knee [de-identified] : 28yo M s/p Right knee patellar tendon reconstruction with dermal allograft augmentation, 6/6/24.  Overall doing well pain is controlled he denies numbness tingling he is walking with crutches [de-identified] : Right knee exam Incisions are healing well no erythema  Range of motion testing 0 to 90 degrees he is able to straight leg raise without extensor lag Able to flex and extend all toes Sensation intact throughout brisk capillary refill. [de-identified] :  The following radiographs were ordered and read by me during this patients visit. I reviewed each radiograph in detail with the patient and discussed the findings as highlighted below.   AP lateral bleak views of the right knee were obtained today long-leg cast in place patella in good position. [de-identified] : 28yo M s/p Right knee patellar tendon reconstruction with dermal allograft augmentation, 6/6/24. [de-identified] : We reviewed postoperative protocol and restrictions.  Continue weightbearing in the brace unlocked 0 to 90 degrees fully unlocked in 2 weeks and removed 2 weeks after he will continue his therapy and exercise program he will follow-up in 6 weeks.  All questions were answered

## 2024-08-20 ENCOUNTER — NON-APPOINTMENT (OUTPATIENT)
Age: 29
End: 2024-08-20

## 2024-09-17 ENCOUNTER — APPOINTMENT (OUTPATIENT)
Dept: ORTHOPEDIC SURGERY | Facility: CLINIC | Age: 29
End: 2024-09-17
Payer: MEDICAID

## 2024-09-17 VITALS — BODY MASS INDEX: 40.8 KG/M2 | HEIGHT: 70 IN | WEIGHT: 285 LBS

## 2024-09-17 DIAGNOSIS — S86.811A STRAIN OF OTHER MUSCLE(S) AND TENDON(S) AT LOWER LEG LEVEL, RIGHT LEG, INITIAL ENCOUNTER: ICD-10-CM

## 2024-09-17 PROCEDURE — 99213 OFFICE O/P EST LOW 20 MIN: CPT

## 2024-09-17 NOTE — HISTORY OF PRESENT ILLNESS
[de-identified] : 30yo M s/p Right knee patellar tendon reconstruction with dermal allograft augmentation, 6/6/24. 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

## 2024-09-17 NOTE — PHYSICAL EXAM
[de-identified] : Right knee exam Incisions are healing well no erythema Range of motion testing 0 to 130 degrees he is able to straight leg raise without extensor lag Able to flex and extend all toes Sensation intact throughout brisk capillary refill.

## 2024-09-17 NOTE — DISCUSSION/SUMMARY
[de-identified] : He is now 3 months postoperative doing well he is excellent range of motion and good strength no extensor lag he will continue his therapy with focus on quadriceps core strengthening.  He will follow-up in 2 months.  He expressed agreement with the plan

## 2024-11-12 ENCOUNTER — APPOINTMENT (OUTPATIENT)
Dept: ORTHOPEDIC SURGERY | Facility: CLINIC | Age: 29
End: 2024-11-12
Payer: MEDICAID

## 2024-11-12 VITALS — WEIGHT: 285 LBS | HEIGHT: 70 IN | BODY MASS INDEX: 40.8 KG/M2

## 2024-11-12 DIAGNOSIS — S86.811A STRAIN OF OTHER MUSCLE(S) AND TENDON(S) AT LOWER LEG LEVEL, RIGHT LEG, INITIAL ENCOUNTER: ICD-10-CM

## 2024-11-12 PROCEDURE — 99213 OFFICE O/P EST LOW 20 MIN: CPT

## 2024-11-19 ENCOUNTER — NON-APPOINTMENT (OUTPATIENT)
Age: 29
End: 2024-11-19

## 2025-01-14 ENCOUNTER — APPOINTMENT (OUTPATIENT)
Dept: ORTHOPEDIC SURGERY | Facility: CLINIC | Age: 30
End: 2025-01-14
Payer: MEDICAID

## 2025-01-14 VITALS — BODY MASS INDEX: 40.8 KG/M2 | HEIGHT: 70 IN | WEIGHT: 285 LBS

## 2025-01-14 DIAGNOSIS — S86.811A STRAIN OF OTHER MUSCLE(S) AND TENDON(S) AT LOWER LEG LEVEL, RIGHT LEG, INITIAL ENCOUNTER: ICD-10-CM

## 2025-01-14 PROCEDURE — 99213 OFFICE O/P EST LOW 20 MIN: CPT | Mod: 25

## 2025-01-14 PROCEDURE — 73562 X-RAY EXAM OF KNEE 3: CPT | Mod: LT,RT

## 2025-04-15 ENCOUNTER — APPOINTMENT (OUTPATIENT)
Dept: ORTHOPEDIC SURGERY | Facility: CLINIC | Age: 30
End: 2025-04-15
Payer: MEDICAID

## 2025-04-15 VITALS — BODY MASS INDEX: 40.8 KG/M2 | HEIGHT: 70 IN | WEIGHT: 285 LBS

## 2025-04-15 DIAGNOSIS — S86.811A STRAIN OF OTHER MUSCLE(S) AND TENDON(S) AT LOWER LEG LEVEL, RIGHT LEG, INITIAL ENCOUNTER: ICD-10-CM

## 2025-04-15 PROCEDURE — 99213 OFFICE O/P EST LOW 20 MIN: CPT | Mod: 25

## 2025-04-15 PROCEDURE — 73562 X-RAY EXAM OF KNEE 3: CPT | Mod: LT,RT

## 2025-04-16 ENCOUNTER — NON-APPOINTMENT (OUTPATIENT)
Age: 30
End: 2025-04-16

## 2025-04-17 ENCOUNTER — NON-APPOINTMENT (OUTPATIENT)
Age: 30
End: 2025-04-17

## 2025-06-03 ENCOUNTER — APPOINTMENT (OUTPATIENT)
Dept: ORTHOPEDIC SURGERY | Facility: CLINIC | Age: 30
End: 2025-06-03

## (undated) DEVICE — DURABLE MEDICAL EQUIPMENT: Type: DURABLE MEDICAL EQUIPMENT

## (undated) DEVICE — GLV 8 PROTEXIS (WHITE)

## (undated) DEVICE — DRAPE 3/4 SHEET W REINFORCEMENT 56X77"

## (undated) DEVICE — DRAPE EXTREMITY 87" X 128.5"

## (undated) DEVICE — VENODYNE/SCD SLEEVE CALF MEDIUM

## (undated) DEVICE — SUT HEWSON RETRIEVER

## (undated) DEVICE — DRSG STERISTRIPS 0.5 X 4"

## (undated) DEVICE — DRSG TEGADERM 4X4.75"

## (undated) DEVICE — Device

## (undated) DEVICE — GLV 7.5 PROTEXIS (WHITE)

## (undated) DEVICE — SUT VICRYL 2-0 27" CT-1 UNDYED

## (undated) DEVICE — FRA-TOURNIQUET 402010060005: Type: DURABLE MEDICAL EQUIPMENT

## (undated) DEVICE — SUT VICRYL 0 27" CT-2 UNDYED

## (undated) DEVICE — SUT HISTOACRYL BLUE

## (undated) DEVICE — DRAPE SURGICAL #1010

## (undated) DEVICE — PREP CHLORAPREP HI-LITE ORANGE 26ML

## (undated) DEVICE — DRSG ACE BANDAGE 6"

## (undated) DEVICE — BLADE SURGICAL #15 CARBON

## (undated) DEVICE — DRSG 4 X 8

## (undated) DEVICE — PACK ORTHO

## (undated) DEVICE — DRSG COBAN 6"

## (undated) DEVICE — SUT FIBERWIRE #2 38" STRAND 1 BLUE T-5 TAPER

## (undated) DEVICE — DRSG WEBRIL 6"

## (undated) DEVICE — SUT MONOCRYL 3-0 27" PS-2 UNDYED

## (undated) DEVICE — WARMING BLANKET UPPER ADULT